# Patient Record
Sex: FEMALE | Race: WHITE | NOT HISPANIC OR LATINO | URBAN - METROPOLITAN AREA
[De-identification: names, ages, dates, MRNs, and addresses within clinical notes are randomized per-mention and may not be internally consistent; named-entity substitution may affect disease eponyms.]

---

## 2020-02-26 ENCOUNTER — APPOINTMENT (RX ONLY)
Dept: URBAN - METROPOLITAN AREA CLINIC 28 | Facility: CLINIC | Age: 45
Setting detail: DERMATOLOGY
End: 2020-02-26

## 2020-02-26 DIAGNOSIS — D485 NEOPLASM OF UNCERTAIN BEHAVIOR OF SKIN: ICD-10-CM

## 2020-02-26 DIAGNOSIS — L73.8 OTHER SPECIFIED FOLLICULAR DISORDERS: ICD-10-CM

## 2020-02-26 DIAGNOSIS — Z85.828 PERSONAL HISTORY OF OTHER MALIGNANT NEOPLASM OF SKIN: ICD-10-CM

## 2020-02-26 DIAGNOSIS — Z71.89 OTHER SPECIFIED COUNSELING: ICD-10-CM

## 2020-02-26 DIAGNOSIS — L82.1 OTHER SEBORRHEIC KERATOSIS: ICD-10-CM

## 2020-02-26 DIAGNOSIS — D22 MELANOCYTIC NEVI: ICD-10-CM

## 2020-02-26 DIAGNOSIS — L81.4 OTHER MELANIN HYPERPIGMENTATION: ICD-10-CM

## 2020-02-26 PROBLEM — D22.5 MELANOCYTIC NEVI OF TRUNK: Status: ACTIVE | Noted: 2020-02-26

## 2020-02-26 PROBLEM — D22.72 MELANOCYTIC NEVI OF LEFT LOWER LIMB, INCLUDING HIP: Status: ACTIVE | Noted: 2020-02-26

## 2020-02-26 PROBLEM — D22.71 MELANOCYTIC NEVI OF RIGHT LOWER LIMB, INCLUDING HIP: Status: ACTIVE | Noted: 2020-02-26

## 2020-02-26 PROBLEM — D22.62 MELANOCYTIC NEVI OF LEFT UPPER LIMB, INCLUDING SHOULDER: Status: ACTIVE | Noted: 2020-02-26

## 2020-02-26 PROBLEM — D48.5 NEOPLASM OF UNCERTAIN BEHAVIOR OF SKIN: Status: ACTIVE | Noted: 2020-02-26

## 2020-02-26 PROBLEM — D22.61 MELANOCYTIC NEVI OF RIGHT UPPER LIMB, INCLUDING SHOULDER: Status: ACTIVE | Noted: 2020-02-26

## 2020-02-26 PROCEDURE — ? COUNSELING

## 2020-02-26 PROCEDURE — ? FULL BODY SKIN EXAM

## 2020-02-26 PROCEDURE — ? DEFER

## 2020-02-26 PROCEDURE — 99203 OFFICE O/P NEW LOW 30 MIN: CPT

## 2020-02-26 ASSESSMENT — LOCATION DETAILED DESCRIPTION DERM
LOCATION DETAILED: RIGHT MEDIAL EYEBROW
LOCATION DETAILED: LEFT PROXIMAL POSTERIOR UPPER ARM
LOCATION DETAILED: LEFT MEDIAL UPPER BACK
LOCATION DETAILED: RIGHT PROXIMAL POSTERIOR UPPER ARM
LOCATION DETAILED: LEFT PROXIMAL PRETIBIAL REGION
LOCATION DETAILED: SUPERIOR THORACIC SPINE
LOCATION DETAILED: RIGHT LATERAL DORSAL FOOT
LOCATION DETAILED: RIGHT PROXIMAL PRETIBIAL REGION
LOCATION DETAILED: RIGHT SUPERIOR MEDIAL UPPER BACK

## 2020-02-26 ASSESSMENT — LOCATION ZONE DERM
LOCATION ZONE: FEET
LOCATION ZONE: LEG
LOCATION ZONE: ARM
LOCATION ZONE: TRUNK
LOCATION ZONE: FACE

## 2020-02-26 ASSESSMENT — LOCATION SIMPLE DESCRIPTION DERM
LOCATION SIMPLE: RIGHT POSTERIOR UPPER ARM
LOCATION SIMPLE: RIGHT EYEBROW
LOCATION SIMPLE: RIGHT UPPER BACK
LOCATION SIMPLE: LEFT PRETIBIAL REGION
LOCATION SIMPLE: RIGHT PRETIBIAL REGION
LOCATION SIMPLE: RIGHT FOOT
LOCATION SIMPLE: LEFT POSTERIOR UPPER ARM
LOCATION SIMPLE: UPPER BACK
LOCATION SIMPLE: LEFT UPPER BACK

## 2020-05-06 ENCOUNTER — TELEPHONE (OUTPATIENT)
Dept: SCHEDULING | Facility: CLINIC | Age: 45
End: 2020-05-06

## 2020-05-06 NOTE — TELEPHONE ENCOUNTER
New Patient Appointment Request    Name of caller: Samreen Kumar    Relationship to patient: self    Diagnosis: NA/ Cardiac Eval .. 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 Wilber Tanner    Previous Cardiologist: PALOMO    Best contact number: [phone]    Additional notes:Please call pt for appt.

## 2020-05-13 NOTE — TELEPHONE ENCOUNTER
Harpreet   I am in the office today at [facility] doing telemedicine visits and we are planning to add some people on for my next Thursday so I cannot see her in May.  My June Thursday schedule is also limited at [facility].  You can offer her an appointment in July with me at [facility].  I do not want to schedule her at [facility] because we are still not sure how the office is going to function-it has not opened yet.  If she needs to see someone sooner she is welcome to see another doctor too.

## 2020-06-10 ENCOUNTER — OFFICE VISIT (OUTPATIENT)
Dept: CARDIOLOGY | Facility: CLINIC | Age: 45
End: 2020-06-10
Payer: COMMERCIAL

## 2020-06-10 VITALS
BODY MASS INDEX: 23.39 KG/M2 | RESPIRATION RATE: 14 BRPM | HEART RATE: 100 BPM | SYSTOLIC BLOOD PRESSURE: 130 MMHG | OXYGEN SATURATION: 99 % | DIASTOLIC BLOOD PRESSURE: 90 MMHG | WEIGHT: 149 LBS | HEIGHT: 67 IN | TEMPERATURE: 98.6 F

## 2020-06-10 DIAGNOSIS — Q21.0 VSD (VENTRICULAR SEPTAL DEFECT): ICD-10-CM

## 2020-06-10 DIAGNOSIS — R07.9 CHEST PAIN, UNSPECIFIED TYPE: Primary | ICD-10-CM

## 2020-06-10 DIAGNOSIS — R00.2 PALPITATIONS: ICD-10-CM

## 2020-06-10 DIAGNOSIS — Z91.89 CARDIOVASCULAR RISK FACTOR: ICD-10-CM

## 2020-06-10 PROBLEM — S06.9XAA BRAIN INJURY (CMS/HCC): Status: ACTIVE | Noted: 2020-06-10

## 2020-06-10 PROBLEM — F19.11 HISTORY OF DRUG ABUSE (CMS/HCC): Status: ACTIVE | Noted: 2020-06-10

## 2020-06-10 PROBLEM — Z87.891 EX-CIGARETTE SMOKER: Status: ACTIVE | Noted: 2020-06-10

## 2020-06-10 PROBLEM — G40.909 SEIZURE DISORDER (CMS/HCC): Status: ACTIVE | Noted: 2020-06-10

## 2020-06-10 PROBLEM — F10.11 HISTORY OF ALCOHOL ABUSE: Status: ACTIVE | Noted: 2020-06-10

## 2020-06-10 PROCEDURE — 93000 ELECTROCARDIOGRAM COMPLETE: CPT | Performed by: INTERNAL MEDICINE

## 2020-06-10 PROCEDURE — 99204 OFFICE O/P NEW MOD 45 MIN: CPT | Performed by: INTERNAL MEDICINE

## 2020-06-10 RX ORDER — PSYLLIUM HUSK 0.4 G
CAPSULE ORAL 3 TIMES DAILY
COMMUNITY

## 2020-06-10 RX ORDER — PSEUDOEPHEDRINE HCL 30 MG
TABLET ORAL
COMMUNITY

## 2020-06-10 RX ORDER — AMANTADINE HYDROCHLORIDE 100 MG/1
100 CAPSULE, GELATIN COATED ORAL 2 TIMES DAILY
COMMUNITY

## 2020-06-10 RX ORDER — LEVETIRACETAM 1000 MG/1
1000 TABLET ORAL 2 TIMES DAILY
COMMUNITY

## 2020-06-10 RX ORDER — FLAXSEED OIL 1000 MG
CAPSULE ORAL DAILY
COMMUNITY

## 2020-06-10 RX ORDER — BUSPIRONE HYDROCHLORIDE 15 MG/1
15 TABLET ORAL 2 TIMES DAILY
COMMUNITY

## 2020-06-10 RX ORDER — SERTRALINE HYDROCHLORIDE 25 MG/1
25 TABLET, FILM COATED ORAL DAILY
COMMUNITY

## 2020-06-10 RX ORDER — BIOTIN 10 MG
TABLET ORAL DAILY
COMMUNITY

## 2020-06-10 ASSESSMENT — ENCOUNTER SYMPTOMS
CONSTITUTIONAL NEGATIVE: 1
MEMORY LOSS: 1
BRUISES/BLEEDS EASILY: 1
ALLERGIC/IMMUNOLOGIC NEGATIVE: 1
MUSCULOSKELETAL NEGATIVE: 1
GASTROINTESTINAL NEGATIVE: 1
SEIZURES: 1
DOUBLE VISION: 1
SPUTUM PRODUCTION: 1
PALPITATIONS: 1

## 2020-06-10 NOTE — LETTER
Flori 10, 2020     Wilber Tanner DO  [address] [address]    Patient: Samreen Kumar  YOB: 1975  Date of Visit: 6/10/2020      Dear Dr. Tanner:    Thank you for referring Samreen Kumar to me for evaluation. Below are my notes for this consultation.    If you have questions, please do not hesitate to call me. I look forward to following your patient along with you.         Sincerely,        Abel Rogers DO        CC: No Recipients  Abel Rogers DO  6/10/2020  2:32 PM  Signed      Chief Complaint: I saw Ms. Kumar as a new patient today.  She is here because she is getting some awareness of her heart rate while she is trying to meditate.  She states it does not make her short of breath lightheaded dizzy nauseous or cause her to vomit.  The palpitations do not come or go are preceded by chest pain or shortness of breath.  She does not experience any syncope.  She also has some erratic breathing she says.  She denies chest discomfort or shortness of breath with exertion, anxiety, cold weather, postprandially, with sex, at rest, or nocturnally.  She can climb a flight of stairs without getting short of breath, she denies proximal nocturnal dyspnea, orthopnea, palpitations she has no syncope no ankle edema she has nocturia.  She states that she had a history of a VSD as a child.  And she had a history of endocarditis.     Medications:  Current Outpatient Medications   Medication Sig Dispense Refill   • amantadine (SYMMETREL) 100 mg capsule Take 100 mg by mouth 2 (two) times a day.     • biotin 10 mg tablet Take by mouth daily.     • busPIRone (BUSPAR) 15 mg tablet Take 15 mg by mouth 2 (two) times a day.     • calcium citrate 250 mg calcium tablet Take by mouth. 300 mg twice daily     • cholecalciferol, vitamin D3, (VITAMIN D3 ORAL) Take 4,000 Units by mouth daily. 150 mg daily     • CHROMIUM ORAL Take by mouth 2 (two) times a day. 200 mg twice daily     • cinnamon bark (CINNAMON  ORAL) Take by mouth. 750 mg three times daily     • flaxseed oil 1,000 mg capsule Take by mouth daily.     • green tea leaf extract (GREEN TEA) capsule Take by mouth 3 (three) times a day.     • L-norgest/e.estradiol-e.estrad (SEASONIQUE ORAL) Take by mouth daily.     • Lactobac no.41/Bifidobact no.7 (PROBIOTIC-10 ORAL) Take by mouth daily.     • levETIRAcetam (KEPPRA) 1,000 mg tablet Take 1,000 mg by mouth 2 (two) times a day.     • selenium 200 mcg capsule Take by mouth 3 (three) times a day.     • sertraline (ZOLOFT) 25 mg tablet Take 25 mg by mouth daily.     • TURMERIC ORAL Take by mouth.     • zinc sulfate (ZINC-15 ORAL) Take by mouth daily.       No current facility-administered medications for this visit.    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  Past surgical history: 5 different brain surgeries, a parathyroidectomy, and multiple GYN procedures.  Social history she smoked 1/2 pack of cigarettes a day for 20 years stopped 11 years ago, alcohol she used to drink 12 beers a day for 24 years stopped 12 years ago, drugs she did cocaine for 15 years stopped 9 years ago.    Family history mother is alive with early dementia no hypertension or diabetes, father is alive with heart disease hypertension and diabetes.  Dietary history: She does not eat beef daily, fried foods daily, does eat greater than 3 eggs per week, eats cheese daily does not drink whole milk.  She drinks no coffee, no tea, no Coke or Pepsi.  Allergies: Trileptal, Bactrim, and penicillin.  Injuries: Fractured fingers.      Review of Systems:  Review of Systems   Constitution: Negative.   HENT: Negative.    Eyes: Positive for double vision.   Cardiovascular: Positive for palpitations.   Respiratory: Positive for sputum production.         Sputum is white to yellow   Endocrine: Positive for heat intolerance.   Hematologic/Lymphatic: Bruises/bleeds easily.   Skin:  Positive for skin cancer.   Musculoskeletal: Negative.    Gastrointestinal: Negative.    Genitourinary: Positive for nocturia.   Neurological: Positive for seizures.   Psychiatric/Behavioral: Positive for memory loss.   Allergic/Immunologic: Negative.        Physical Exam:  Vitals:    06/10/20 1331   BP: 130/90   Pulse: 100   Resp: 14   Temp: 37 °C (98.6 °F)   SpO2: 99%     Physical Exam   Constitutional: She is oriented to person, place, and time. She appears well-developed and well-nourished.   HENT:   Head: Normocephalic and atraumatic.   Eyes: Pupils are equal, round, and reactive to light. Conjunctivae and EOM are normal.   Neck: Normal range of motion. Neck supple.   Cardiovascular: Normal rate, regular rhythm, normal heart sounds and intact distal pulses.   Pulmonary/Chest: Effort normal and breath sounds normal.   Abdominal: Soft. Bowel sounds are normal.   Musculoskeletal: Normal range of motion.   Neurological: She is alert and oriented to person, place, and time. She has normal strength and normal reflexes.   Skin: Skin is warm, dry and intact.   Psychiatric: She has a normal mood and affect. Her speech is normal and behavior is normal. Judgment and thought content normal. Cognition and memory are normal.     EKG:SR WNL    Assessment and Plan:  Impression:  Palpitations.  Dyspnea.  History of endocarditis.  History of ventricular septal defect.  Recommendation:  We are going to do an echocardiogram to look for any scarring or any significant mitral regurgitation or probable tricuspid regurgitation since I do not hear a murmur.  Also be looking for the VSD.  We will do a Holter monitor to try and see what she is talking about in terms of awareness of her heartbeat.  If the studies are negative there is no necessity for her to return.  We will call her tell her if it is necessary and give her the results.  Thank you for allowing us to meet this very nice woman in the office today.    Abel Rogers, DO

## 2020-06-10 NOTE — PROGRESS NOTES
Chief Complaint: I saw Ms. Kumar as a new patient today.  She is here because she is getting some awareness of her heart rate while she is trying to meditate.  She states it does not make her short of breath lightheaded dizzy nauseous or cause her to vomit.  The palpitations do not come or go are preceded by chest pain or shortness of breath.  She does not experience any syncope.  She also has some erratic breathing she says.  She denies chest discomfort or shortness of breath with exertion, anxiety, cold weather, postprandially, with sex, at rest, or nocturnally.  She can climb a flight of stairs without getting short of breath, she denies proximal nocturnal dyspnea, orthopnea, palpitations she has no syncope no ankle edema she has nocturia.  She states that she had a history of a VSD as a child.  And she had a history of endocarditis.     Medications:  Current Outpatient Medications   Medication Sig Dispense Refill   • amantadine (SYMMETREL) 100 mg capsule Take 100 mg by mouth 2 (two) times a day.     • biotin 10 mg tablet Take by mouth daily.     • busPIRone (BUSPAR) 15 mg tablet Take 15 mg by mouth 2 (two) times a day.     • calcium citrate 250 mg calcium tablet Take by mouth. 300 mg twice daily     • cholecalciferol, vitamin D3, (VITAMIN D3 ORAL) Take 4,000 Units by mouth daily. 150 mg daily     • CHROMIUM ORAL Take by mouth 2 (two) times a day. 200 mg twice daily     • cinnamon bark (CINNAMON ORAL) Take by mouth. 750 mg three times daily     • flaxseed oil 1,000 mg capsule Take by mouth daily.     • green tea leaf extract (GREEN TEA) capsule Take by mouth 3 (three) times a day.     • L-norgest/e.estradiol-e.estrad (SEASONIQUE ORAL) Take by mouth daily.     • Lactobac no.41/Bifidobact no.7 (PROBIOTIC-10 ORAL) Take by mouth daily.     • levETIRAcetam (KEPPRA) 1,000 mg tablet Take 1,000 mg by mouth 2 (two) times a day.     • selenium 200 mcg capsule Take by mouth 3 (three) times a day.     • sertraline  (ZOLOFT) 25 mg tablet Take 25 mg by mouth daily.     • TURMERIC ORAL Take by mouth.     • zinc sulfate (ZINC-15 ORAL) Take by mouth daily.       No current facility-administered medications for this visit.    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  Past surgical history: 5 different brain surgeries, a parathyroidectomy, and multiple GYN procedures.  Social history she smoked 1/2 pack of cigarettes a day for 20 years stopped 11 years ago, alcohol she used to drink 12 beers a day for 24 years stopped 12 years ago, drugs she did cocaine for 15 years stopped 9 years ago.    Family history mother is alive with early dementia no hypertension or diabetes, father is alive with heart disease hypertension and diabetes.  Dietary history: She does not eat beef daily, fried foods daily, does eat greater than 3 eggs per week, eats cheese daily does not drink whole milk.  She drinks no coffee, no tea, no Coke or Pepsi.  Allergies: Trileptal, Bactrim, and penicillin.  Injuries: Fractured fingers.      Review of Systems:  Review of Systems   Constitution: Negative.   HENT: Negative.    Eyes: Positive for double vision.   Cardiovascular: Positive for palpitations.   Respiratory: Positive for sputum production.         Sputum is white to yellow   Endocrine: Positive for heat intolerance.   Hematologic/Lymphatic: Bruises/bleeds easily.   Skin: Positive for skin cancer.   Musculoskeletal: Negative.    Gastrointestinal: Negative.    Genitourinary: Positive for nocturia.   Neurological: Positive for seizures.   Psychiatric/Behavioral: Positive for memory loss.   Allergic/Immunologic: Negative.        Physical Exam:  Vitals:    06/10/20 1331   BP: 130/90   Pulse: 100   Resp: 14   Temp: 37 °C (98.6 °F)   SpO2: 99%     Physical Exam   Constitutional: She is oriented to person, place, and time. She appears well-developed and well-nourished.   HENT:   Head:  Normocephalic and atraumatic.   Eyes: Pupils are equal, round, and reactive to light. Conjunctivae and EOM are normal.   Neck: Normal range of motion. Neck supple.   Cardiovascular: Normal rate, regular rhythm, normal heart sounds and intact distal pulses.   Pulmonary/Chest: Effort normal and breath sounds normal.   Abdominal: Soft. Bowel sounds are normal.   Musculoskeletal: Normal range of motion.   Neurological: She is alert and oriented to person, place, and time. She has normal strength and normal reflexes.   Skin: Skin is warm, dry and intact.   Psychiatric: She has a normal mood and affect. Her speech is normal and behavior is normal. Judgment and thought content normal. Cognition and memory are normal.     EKG:SR WNL    Assessment and Plan:  Impression:  Palpitations.  Dyspnea.  History of endocarditis.  History of ventricular septal defect.  Recommendation:  We are going to do an echocardiogram to look for any scarring or any significant mitral regurgitation or probable tricuspid regurgitation since I do not hear a murmur.  Also be looking for the VSD.  We will do a Holter monitor to try and see what she is talking about in terms of awareness of her heartbeat.  If the studies are negative there is no necessity for her to return.  We will call her tell her if it is necessary and give her the results.  Thank you for allowing us to meet this very nice woman in the office today.    Abel Rogers,

## 2020-09-09 ENCOUNTER — TELEPHONE (OUTPATIENT)
Dept: CARDIOLOGY | Facility: CLINIC | Age: 45
End: 2020-09-09

## 2020-09-09 NOTE — TELEPHONE ENCOUNTER
Received notice from registration that authorization is needed for pt's Echo scheduled on 9/17.     No encounter was sent to request pre-cert.     Please obtain pre-cert for this pt.  Thank You!

## 2020-09-17 ENCOUNTER — HOSPITAL ENCOUNTER (OUTPATIENT)
Dept: CARDIOLOGY | Facility: HOSPITAL | Age: 45
Discharge: HOME | End: 2020-09-17
Attending: INTERNAL MEDICINE
Payer: COMMERCIAL

## 2020-09-17 VITALS
WEIGHT: 149 LBS | HEART RATE: 82 BPM | DIASTOLIC BLOOD PRESSURE: 74 MMHG | SYSTOLIC BLOOD PRESSURE: 110 MMHG | HEIGHT: 67 IN | BODY MASS INDEX: 23.39 KG/M2

## 2020-09-17 LAB
AORTIC ROOT ANNULUS - M-MODE: 3.6 CM
AORTIC VALVE MEAN VELOCITY: 0.75 M/S
AORTIC VALVE VELOCITY TIME INTEGRAL: 14.1 CM
AV MEAN GRADIENT: 2 MMHG
AV PEAK GRADIENT: 4 MMHG
AV PEAK VELOCITY-S: 0.98 M/S
BSA FOR ECHO PROCEDURE: 1.79 M2
CUSP SEPARATION: 1.8 CM
DOP CALC LVOT STROKE VOLUME: 46.66 ML
E WAVE DECELERATION TIME: 95 MS
E/A RATIO: 0.9
E/E' RATIO: 12.1
E/LAT E' RATIO: 9.7
EDV (BP): 73 CM3
EDV (MM-TEICH): 92.4 CM3
EF (A4C): 51 %
EF (MM-TEICH): 55.6 %
EF A2C: 51 %
EJECTION FRACTION: 51 %
EST RIGHT VENT SYSTOLIC PRESSURE BY TRICUSPID REGURGITATION JET: 11 MMHG
ESV (BP): 36 CM3
ESV (MM-TEICH): 41 CM3
FRACTIONAL SHORTENING: 29.3 %
INTERVENTRICULAR SEPTUM: 1 CM
LA ESV (BP): 28 CM3
LA ESV INDEX (A2C): 12.29 CM3/M2
LA ESV INDEX (BP): 15.64 CM3/M2
LA/AORTA RATIO: 0.56
LAAS-AP2: 11 CM2
LAAS-AP4: 13 CM2
LAD 2D - M-MODE: 2 CM
LALD A4C: 4.09 CM
LALD A4C: 4.54 CM
LAV-S: 22 CM3
LEFT ATRIUM VOLUME INDEX: 18.44 CM3/M2
LEFT ATRIUM VOLUME: 33 CM3
LEFT INTERNAL DIMENSION IN SYSTOLE: 2.9 CM (ref 2.48–3.75)
LEFT VENTRICLE DIASTOLIC VOLUME INDEX: 41.34 CM3/M2
LEFT VENTRICLE DIASTOLIC VOLUME: 74 CM3
LEFT VENTRICLE SYSTOLIC VOLUME INDEX: 20.11 CM3/M2
LEFT VENTRICLE SYSTOLIC VOLUME: 36 CM3
LEFT VENTRICULAR INTERNAL DIMENSION IN DIASTOLE: 4.1 CM (ref 4.19–5.81)
LEFT VENTRICULAR POSTERIOR WALL IN END DIASTOLE: 1 CM (ref 0.55–1.02)
LV DIASTOLIC VOLUME: 69 CM3
LV ESV (APICAL 2 CHAMBER): 34 CM3
LVAD-AP2: 23.5 CM2
LVAD-AP4: 24 CM2
LVAS-AP2: 15.9 CM2
LVAS-AP4: 15.7 CM2
LVEDVI(A2C): 38.55 CM3/M2
LVEDVI(BP): 40.78 CM3/M2
LVESVI(A2C): 18.99 CM3/M2
LVESVI(BP): 20.11 CM3/M2
LVLD-AP2: 6.85 CM
LVLD-AP4: 6.59 CM
LVLS-AP2: 6.32 CM
LVLS-AP4: 5.89 CM
LVOT 2D: 2.5 CM
LVOT A: 4.91 CM2
LVOT MG: 1 MMHG
LVOT MV: 0.42 M/S
LVOT PEAK VELOCITY: 0.68 M/S
LVOT VTI: 9.51 CM
M MODE - INTERVENTRICULAR SEPTUM IN END DIASTOLE: 0.8 CM
M MODE - LEFT INTERNAL DIMENSION IN SYSTOLE: 3.2 CM
M MODE - LEFT VENTRICULAR INTERNAL DIMENSION IN DIASTOLE: 4.5 CM
M MODE - LEFT VENTRICULAR POSTERIOR WALL IN END DIASTOLE: 0.8 CM
M MODE - LEFT VENTRICULAR POSTERIOR WALL IN END DIASTOLE: 0.8 CM
MV E'TISSUE VEL-LAT: 0.06 M/S
MV E'TISSUE VEL-MED: 0.04 M/S
MV PEAK A VEL: 0.58 M/S
MV PEAK E VEL: 0.53 M/S
POSTERIOR WALL: 1 CM
PULMONARY REGURGITATION LATE DIASTOLIC VELOCITY: 1.1 M/S
RVOT VMAX: 0.48 M/S
RVOT VTI: 6.65 CM
TR MAX PG: 11 MMHG
TRICUSPID VALVE PEAK REGURGITATION VELOCITY: 1.68 M/S
Z-SCORE OF LEFT VENTRICULAR DIMENSION IN END DIASTOLE: -1.85
Z-SCORE OF LEFT VENTRICULAR DIMENSION IN END SYSTOLE: -0.4
Z-SCORE OF LEFT VENTRICULAR POSTERIOR WALL IN END DIASTOLE: 1.51

## 2020-09-17 PROCEDURE — 93306 TTE W/DOPPLER COMPLETE: CPT

## 2020-09-17 PROCEDURE — 93306 TTE W/DOPPLER COMPLETE: CPT | Mod: 26 | Performed by: INTERNAL MEDICINE

## 2021-06-30 ENCOUNTER — APPOINTMENT (RX ONLY)
Dept: URBAN - METROPOLITAN AREA CLINIC 28 | Facility: CLINIC | Age: 46
Setting detail: DERMATOLOGY
End: 2021-06-30

## 2021-06-30 DIAGNOSIS — Z71.89 OTHER SPECIFIED COUNSELING: ICD-10-CM

## 2021-06-30 DIAGNOSIS — Z85.828 PERSONAL HISTORY OF OTHER MALIGNANT NEOPLASM OF SKIN: ICD-10-CM

## 2021-06-30 DIAGNOSIS — L82.1 OTHER SEBORRHEIC KERATOSIS: ICD-10-CM

## 2021-06-30 DIAGNOSIS — L81.4 OTHER MELANIN HYPERPIGMENTATION: ICD-10-CM

## 2021-06-30 DIAGNOSIS — L30.8 OTHER SPECIFIED DERMATITIS: ICD-10-CM

## 2021-06-30 DIAGNOSIS — L73.8 OTHER SPECIFIED FOLLICULAR DISORDERS: ICD-10-CM

## 2021-06-30 DIAGNOSIS — D22 MELANOCYTIC NEVI: ICD-10-CM

## 2021-06-30 DIAGNOSIS — R23.1 PALLOR: ICD-10-CM

## 2021-06-30 PROBLEM — D22.5 MELANOCYTIC NEVI OF TRUNK: Status: ACTIVE | Noted: 2021-06-30

## 2021-06-30 PROBLEM — D22.71 MELANOCYTIC NEVI OF RIGHT LOWER LIMB, INCLUDING HIP: Status: ACTIVE | Noted: 2021-06-30

## 2021-06-30 PROBLEM — D22.72 MELANOCYTIC NEVI OF LEFT LOWER LIMB, INCLUDING HIP: Status: ACTIVE | Noted: 2021-06-30

## 2021-06-30 PROBLEM — D22.61 MELANOCYTIC NEVI OF RIGHT UPPER LIMB, INCLUDING SHOULDER: Status: ACTIVE | Noted: 2021-06-30

## 2021-06-30 PROBLEM — D22.62 MELANOCYTIC NEVI OF LEFT UPPER LIMB, INCLUDING SHOULDER: Status: ACTIVE | Noted: 2021-06-30

## 2021-06-30 PROCEDURE — ? PRESCRIPTION

## 2021-06-30 PROCEDURE — 99214 OFFICE O/P EST MOD 30 MIN: CPT

## 2021-06-30 PROCEDURE — ? COUNSELING

## 2021-06-30 PROCEDURE — ? FULL BODY SKIN EXAM

## 2021-06-30 PROCEDURE — ? PRESCRIPTION MEDICATION MANAGEMENT

## 2021-06-30 PROCEDURE — ? ORDER TESTS

## 2021-06-30 RX ORDER — TRIAMCINOLONE ACETONIDE 1 MG/G
CREAM TOPICAL
Qty: 1 | Refills: 2 | Status: ERX | COMMUNITY
Start: 2021-06-30

## 2021-06-30 RX ADMIN — TRIAMCINOLONE ACETONIDE: 1 CREAM TOPICAL at 00:00

## 2021-06-30 ASSESSMENT — LOCATION DETAILED DESCRIPTION DERM
LOCATION DETAILED: RIGHT MEDIAL EYEBROW
LOCATION DETAILED: RIGHT 4TH TOE
LOCATION DETAILED: RIGHT PROXIMAL PRETIBIAL REGION
LOCATION DETAILED: RIGHT DORSAL MIDDLE FINGER METACARPOPHALANGEAL JOINT
LOCATION DETAILED: LEFT ANTERIOR DISTAL THIGH
LOCATION DETAILED: LEFT PROXIMAL POSTERIOR UPPER ARM
LOCATION DETAILED: RIGHT ANTERIOR DISTAL THIGH
LOCATION DETAILED: RIGHT PROXIMAL POSTERIOR UPPER ARM
LOCATION DETAILED: LEFT MEDIAL UPPER BACK
LOCATION DETAILED: SUPERIOR THORACIC SPINE
LOCATION DETAILED: LEFT PROXIMAL PRETIBIAL REGION
LOCATION DETAILED: RIGHT SUPERIOR MEDIAL UPPER BACK

## 2021-06-30 ASSESSMENT — LOCATION SIMPLE DESCRIPTION DERM
LOCATION SIMPLE: RIGHT EYEBROW
LOCATION SIMPLE: RIGHT 4TH TOE
LOCATION SIMPLE: UPPER BACK
LOCATION SIMPLE: LEFT UPPER BACK
LOCATION SIMPLE: LEFT POSTERIOR UPPER ARM
LOCATION SIMPLE: RIGHT POSTERIOR UPPER ARM
LOCATION SIMPLE: RIGHT UPPER BACK
LOCATION SIMPLE: LEFT PRETIBIAL REGION
LOCATION SIMPLE: RIGHT PRETIBIAL REGION
LOCATION SIMPLE: RIGHT HAND
LOCATION SIMPLE: RIGHT THIGH
LOCATION SIMPLE: LEFT THIGH

## 2021-06-30 ASSESSMENT — LOCATION ZONE DERM
LOCATION ZONE: HAND
LOCATION ZONE: FACE
LOCATION ZONE: TRUNK
LOCATION ZONE: LEG
LOCATION ZONE: ARM
LOCATION ZONE: TOE

## 2021-06-30 NOTE — PROCEDURE: PRESCRIPTION MEDICATION MANAGEMENT
Render In Strict Bullet Format?: No
Initiate Treatment: triamcinolone acetonide 0.1 % topical cream: Apply,a thin layer to AA of hands BID
Detail Level: Zone

## 2021-06-30 NOTE — PROCEDURE: ORDER TESTS
Expected Date Of Service: 06/30/2021
Performing Laboratory: [id number]
Billing Type: Third-Party Bill
Bill For Surgical Tray: no

## 2023-01-04 ENCOUNTER — APPOINTMENT (RX ONLY)
Dept: URBAN - METROPOLITAN AREA CLINIC 28 | Facility: CLINIC | Age: 48
Setting detail: DERMATOLOGY
End: 2023-01-04

## 2023-01-04 DIAGNOSIS — L70.0 ACNE VULGARIS: ICD-10-CM | Status: INADEQUATELY CONTROLLED

## 2023-01-04 DIAGNOSIS — D22 MELANOCYTIC NEVI: ICD-10-CM

## 2023-01-04 DIAGNOSIS — R23.1 PALLOR: ICD-10-CM

## 2023-01-04 DIAGNOSIS — L81.4 OTHER MELANIN HYPERPIGMENTATION: ICD-10-CM

## 2023-01-04 DIAGNOSIS — Z85.828 PERSONAL HISTORY OF OTHER MALIGNANT NEOPLASM OF SKIN: ICD-10-CM

## 2023-01-04 PROBLEM — D22.5 MELANOCYTIC NEVI OF TRUNK: Status: ACTIVE | Noted: 2023-01-04

## 2023-01-04 PROBLEM — D22.72 MELANOCYTIC NEVI OF LEFT LOWER LIMB, INCLUDING HIP: Status: ACTIVE | Noted: 2023-01-04

## 2023-01-04 PROCEDURE — ? DIAGNOSIS COMMENT

## 2023-01-04 PROCEDURE — ? PHOTO-DOCUMENTATION

## 2023-01-04 PROCEDURE — 99214 OFFICE O/P EST MOD 30 MIN: CPT

## 2023-01-04 PROCEDURE — ? PRESCRIPTION

## 2023-01-04 PROCEDURE — ? PRESCRIPTION MEDICATION MANAGEMENT

## 2023-01-04 PROCEDURE — ? SUNSCREEN RECOMMENDATIONS

## 2023-01-04 PROCEDURE — ? COUNSELING

## 2023-01-04 PROCEDURE — ? FULL BODY SKIN EXAM

## 2023-01-04 RX ORDER — BENZOYL PEROXIDE 100 MG/G
LOTION TOPICAL QDAY
Qty: 237 | Refills: 3 | Status: ERX | COMMUNITY
Start: 2023-01-04

## 2023-01-04 RX ORDER — CLINDAMYCIN PHOSPHATE 10 MG/ML
LOTION TOPICAL QAM
Qty: 60 | Refills: 3 | Status: ERX | COMMUNITY
Start: 2023-01-04

## 2023-01-04 RX ADMIN — BENZOYL PEROXIDE: 100 LOTION TOPICAL at 00:00

## 2023-01-04 RX ADMIN — CLINDAMYCIN PHOSPHATE: 10 LOTION TOPICAL at 00:00

## 2023-01-04 ASSESSMENT — LOCATION DETAILED DESCRIPTION DERM
LOCATION DETAILED: SUPERIOR THORACIC SPINE
LOCATION DETAILED: LEFT CENTRAL MALAR CHEEK
LOCATION DETAILED: LEFT MEDIAL 5TH TOE
LOCATION DETAILED: LEFT KNEE

## 2023-01-04 ASSESSMENT — LOCATION ZONE DERM
LOCATION ZONE: TOE
LOCATION ZONE: TRUNK
LOCATION ZONE: LEG
LOCATION ZONE: FACE

## 2023-01-04 ASSESSMENT — LOCATION SIMPLE DESCRIPTION DERM
LOCATION SIMPLE: UPPER BACK
LOCATION SIMPLE: LEFT CHEEK
LOCATION SIMPLE: LEFT 5TH TOE
LOCATION SIMPLE: LEFT KNEE

## 2023-01-04 NOTE — PROCEDURE: COUNSELING
Detail Level: Detailed
Detail Level: Generalized
Isotretinoin Counseling: Patient should get monthly blood tests, not donate blood, not drive at night if vision affected, not share medication, and not undergo elective surgery for 6 months after tx completed. Side effects reviewed, pt to contact office should one occur.
Bactrim Pregnancy And Lactation Text: This medication is Pregnancy Category D and is known to cause fetal risk.  It is also excreted in breast milk.
Spironolactone Pregnancy And Lactation Text: This medication can cause feminization of the male fetus and should be avoided during pregnancy. The active metabolite is also found in breast milk.
Benzoyl Peroxide Counseling: Patient counseled that medicine may cause skin irritation and bleach clothing.  In the event of skin irritation, the patient was advised to reduce the amount of the drug applied or use it less frequently.   The patient verbalized understanding of the proper use and possible adverse effects of benzoyl peroxide.  All of the patient's questions and concerns were addressed.
Azithromycin Pregnancy And Lactation Text: This medication is considered safe during pregnancy and is also secreted in breast milk.
Topical Retinoid counseling:  Patient advised to apply a pea-sized amount only at bedtime and wait 30 minutes after washing their face before applying.  If too drying, patient may add a non-comedogenic moisturizer. The patient verbalized understanding of the proper use and possible adverse effects of retinoids.  All of the patient's questions and concerns were addressed.
Topical Clindamycin Pregnancy And Lactation Text: This medication is Pregnancy Category B and is considered safe during pregnancy. It is unknown if it is excreted in breast milk.
Minocycline Pregnancy And Lactation Text: This medication is Pregnancy Category D and not consider safe during pregnancy. It is also excreted in breast milk.
Topical Sulfur Applications Pregnancy And Lactation Text: This medication is Pregnancy Category C and has an unknown safety profile during pregnancy. It is unknown if this topical medication is excreted in breast milk.
Erythromycin Counseling:  I discussed with the patient the risks of erythromycin including but not limited to GI upset, allergic reaction, drug rash, diarrhea, increase in liver enzymes, and yeast infections.
Doxycycline Counseling:  Patient counseled regarding possible photosensitivity and increased risk for sunburn.  Patient instructed to avoid sunlight, if possible.  When exposed to sunlight, patients should wear protective clothing, sunglasses, and sunscreen.  The patient was instructed to call the office immediately if the following severe adverse effects occur:  hearing changes, easy bruising/bleeding, severe headache, or vision changes.  The patient verbalized understanding of the proper use and possible adverse effects of doxycycline.  All of the patient's questions and concerns were addressed.
Aklief counseling:  Patient advised to apply a pea-sized amount only at bedtime and wait 30 minutes after washing their face before applying.  If too drying, patient may add a non-comedogenic moisturizer.  The most commonly reported side effects including irritation, redness, scaling, dryness, stinging, burning, itching, and increased risk of sunburn.  The patient verbalized understanding of the proper use and possible adverse effects of retinoids.  All of the patient's questions and concerns were addressed.
Azelaic Acid Counseling: Patient counseled that medicine may cause skin irritation and to avoid applying near the eyes.  In the event of skin irritation, the patient was advised to reduce the amount of the drug applied or use it less frequently.   The patient verbalized understanding of the proper use and possible adverse effects of azelaic acid.  All of the patient's questions and concerns were addressed.
Topical Retinoid Pregnancy And Lactation Text: This medication is Pregnancy Category C. It is unknown if this medication is excreted in breast milk.
Isotretinoin Pregnancy And Lactation Text: This medication is Pregnancy Category X and is considered extremely dangerous during pregnancy. It is unknown if it is excreted in breast milk.
Tazorac Pregnancy And Lactation Text: This medication is not safe during pregnancy. It is unknown if this medication is excreted in breast milk.
Dapsone Counseling: I discussed with the patient the risks of dapsone including but not limited to hemolytic anemia, agranulocytosis, rashes, methemoglobinemia, kidney failure, peripheral neuropathy, headaches, GI upset, and liver toxicity.  Patients who start dapsone require monitoring including baseline LFTs and weekly CBCs for the first month, then every month thereafter.  The patient verbalized understanding of the proper use and possible adverse effects of dapsone.  All of the patient's questions and concerns were addressed.
High Dose Vitamin A Pregnancy And Lactation Text: High dose vitamin A therapy is contraindicated during pregnancy and breast feeding.
Birth Control Pills Counseling: Birth Control Pill Counseling: I discussed with the patient the potential side effects of OCPs including but not limited to increased risk of stroke, heart attack, thrombophlebitis, deep venous thrombosis, hepatic adenomas, breast changes, GI upset, headaches, and depression.  The patient verbalized understanding of the proper use and possible adverse effects of OCPs. All of the patient's questions and concerns were addressed.
Spironolactone Counseling: Patient advised regarding risks of diarrhea, abdominal pain, hyperkalemia, birth defects (for female patients), liver toxicity and renal toxicity. The patient may need blood work to monitor liver and kidney function and potassium levels while on therapy. The patient verbalized understanding of the proper use and possible adverse effects of spironolactone.  All of the patient's questions and concerns were addressed.
Include Pregnancy/Lactation Warning?: No
Benzoyl Peroxide Pregnancy And Lactation Text: This medication is Pregnancy Category C. It is unknown if benzoyl peroxide is excreted in breast milk.
Tetracycline Counseling: Patient counseled regarding possible photosensitivity and increased risk for sunburn.  Patient instructed to avoid sunlight, if possible.  When exposed to sunlight, patients should wear protective clothing, sunglasses, and sunscreen.  The patient was instructed to call the office immediately if the following severe adverse effects occur:  hearing changes, easy bruising/bleeding, severe headache, or vision changes.  The patient verbalized understanding of the proper use and possible adverse effects of tetracycline.  All of the patient's questions and concerns were addressed. Patient understands to avoid pregnancy while on therapy due to potential birth defects.
Erythromycin Pregnancy And Lactation Text: This medication is Pregnancy Category B and is considered safe during pregnancy. It is also excreted in breast milk.
Winlevi Counseling:  I discussed with the patient the risks of topical clascoterone including but not limited to erythema, scaling, itching, and stinging. Patient voiced their understanding.
Doxycycline Pregnancy And Lactation Text: This medication is Pregnancy Category D and not consider safe during pregnancy. It is also excreted in breast milk but is considered safe for shorter treatment courses.
Bactrim Counseling:  I discussed with the patient the risks of sulfa antibiotics including but not limited to GI upset, allergic reaction, drug rash, diarrhea, dizziness, photosensitivity, and yeast infections.  Rarely, more serious reactions can occur including but not limited to aplastic anemia, agranulocytosis, methemoglobinemia, blood dyscrasias, liver or kidney failure, lung infiltrates or desquamative/blistering drug rashes.
Sarecycline Counseling: Patient advised regarding possible photosensitivity and discoloration of the teeth, skin, lips, tongue and gums.  Patient instructed to avoid sunlight, if possible.  When exposed to sunlight, patients should wear protective clothing, sunglasses, and sunscreen.  The patient was instructed to call the office immediately if the following severe adverse effects occur:  hearing changes, easy bruising/bleeding, severe headache, or vision changes.  The patient verbalized understanding of the proper use and possible adverse effects of sarecycline.  All of the patient's questions and concerns were addressed.
Topical Sulfur Applications Counseling: Topical Sulfur Counseling: Patient counseled that this medication may cause skin irritation or allergic reactions.  In the event of skin irritation, the patient was advised to reduce the amount of the drug applied or use it less frequently.   The patient verbalized understanding of the proper use and possible adverse effects of topical sulfur application.  All of the patient's questions and concerns were addressed.
Azelaic Acid Pregnancy And Lactation Text: This medication is considered safe during pregnancy and breast feeding.
Azithromycin Counseling:  I discussed with the patient the risks of azithromycin including but not limited to GI upset, allergic reaction, drug rash, diarrhea, and yeast infections.
Minocycline Counseling: Patient advised regarding possible photosensitivity and discoloration of the teeth, skin, lips, tongue and gums.  Patient instructed to avoid sunlight, if possible.  When exposed to sunlight, patients should wear protective clothing, sunglasses, and sunscreen.  The patient was instructed to call the office immediately if the following severe adverse effects occur:  hearing changes, easy bruising/bleeding, severe headache, or vision changes.  The patient verbalized understanding of the proper use and possible adverse effects of minocycline.  All of the patient's questions and concerns were addressed.
Aklief Pregnancy And Lactation Text: It is unknown if this medication is safe to use during pregnancy.  It is unknown if this medication is excreted in breast milk.  Breastfeeding women should use the topical cream on the smallest area of the skin for the shortest time needed while breastfeeding.  Do not apply to nipple and areola.
Tazorac Counseling:  Patient advised that medication is irritating and drying.  Patient may need to apply sparingly and wash off after an hour before eventually leaving it on overnight.  The patient verbalized understanding of the proper use and possible adverse effects of tazorac.  All of the patient's questions and concerns were addressed.
Dapsone Pregnancy And Lactation Text: This medication is Pregnancy Category C and is not considered safe during pregnancy or breast feeding.
Topical Clindamycin Counseling: Patient counseled that this medication may cause skin irritation or allergic reactions.  In the event of skin irritation, the patient was advised to reduce the amount of the drug applied or use it less frequently.   The patient verbalized understanding of the proper use and possible adverse effects of clindamycin.  All of the patient's questions and concerns were addressed.
Winlevi Pregnancy And Lactation Text: This medication is considered safe during pregnancy and breastfeeding.
High Dose Vitamin A Counseling: Side effects reviewed, pt to contact office should one occur.
Birth Control Pills Pregnancy And Lactation Text: This medication should be avoided if pregnant and for the first 30 days post-partum.

## 2023-01-04 NOTE — PROCEDURE: SUNSCREEN RECOMMENDATIONS
Products Recommended: Recommended using over-the-counter sunscreen SPF 30 or greater, with UVB and UVA protection.\\n\\nAccording to Consumer Reports:\\n\\nThree sunscreens were given the Consumer Reports \"Best Buy\" rating:\\n\\nUp & Up Sport SPF 30\\nNo-Ad with Aloe and Vitamin E SPF 45\\nEquate Baby SPF 50\\n\\nSix others were recommended:\\nBanana Boat Sport Performance SPF 30\\nCoppertone Sport Ultra Sweatproof SPF 30\\nCVS Fast Cover Sport SPF 30\\nW[employer]reens Sport SPF 50\\nOcean Potion Kids Instant Dry Mist SPF 50\\nBanana Boat Sport Performance 
Detail Level: Generalized
General Sunscreen Counseling: Yearly skin checks with history of skin cancer.\\n\\nThe nature of sun-induced photo-aging and skin cancers is discussed. Sun avoidance, protective clothing, and the use of 30-SPF sunscreens are advised. Observe closely for skin damage/changes, and call if such occur.\\n\\nAdvised to use daily sunscreen SPF 30 with UVB and UVA protection daily. Apply at least 10 minutes prior to going outside, and reapply every 2 hours outside. Recommended sunblocks with zinc oxide or titanium dioxide (though these tend not to rub in as well).\\n

## 2023-01-04 NOTE — PROCEDURE: PRESCRIPTION MEDICATION MANAGEMENT
Detail Level: Zone
Initiate Treatment: clindamycin 1 % lotion: Apply a thin layer to face QAM\\nbenzoyl peroxide 10 % topical cleanser: Wash acne of face QDAY in the shower
Render In Strict Bullet Format?: No

## 2023-01-04 NOTE — PROCEDURE: DIAGNOSIS COMMENT
Comment: Likely a side effect of amantadine, improved with lower dose per patient
Detail Level: Simple
Render Risk Assessment In Note?: no

## 2023-09-26 ENCOUNTER — CONSULT (OUTPATIENT)
Dept: NEUROLOGY | Facility: CLINIC | Age: 48
End: 2023-09-26
Payer: COMMERCIAL

## 2023-09-26 VITALS
WEIGHT: 149.2 LBS | HEIGHT: 66 IN | OXYGEN SATURATION: 96 % | BODY MASS INDEX: 23.98 KG/M2 | HEART RATE: 76 BPM | SYSTOLIC BLOOD PRESSURE: 110 MMHG | DIASTOLIC BLOOD PRESSURE: 76 MMHG

## 2023-09-26 DIAGNOSIS — G43.709 CHRONIC MIGRAINE WITHOUT AURA WITHOUT STATUS MIGRAINOSUS, NOT INTRACTABLE: ICD-10-CM

## 2023-09-26 DIAGNOSIS — R51.9 WORSENING HEADACHES: ICD-10-CM

## 2023-09-26 DIAGNOSIS — G40.219 FOCAL EPILEPSY WITH IMPAIRMENT OF CONSCIOUSNESS, INTRACTABLE (HCC): Primary | ICD-10-CM

## 2023-09-26 PROBLEM — D07.1 CARCINOMA IN SITU OF VULVA: Status: ACTIVE | Noted: 2021-03-17

## 2023-09-26 PROBLEM — Z87.820 HISTORY OF TRAUMATIC BRAIN INJURY: Status: ACTIVE | Noted: 2018-09-21

## 2023-09-26 PROBLEM — I34.1 MITRAL VALVE PROLAPSE: Status: ACTIVE | Noted: 2023-09-26

## 2023-09-26 PROBLEM — Z86.39 HX OF HYPERPARATHYROIDISM: Status: ACTIVE | Noted: 2019-09-23

## 2023-09-26 PROBLEM — S03.00XA TMJ (DISLOCATION OF TEMPOROMANDIBULAR JOINT): Status: ACTIVE | Noted: 2018-09-21

## 2023-09-26 PROBLEM — R87.610 ATYPICAL SQUAMOUS CELLS OF UNDETERMINED SIGNIFICANCE ON CYTOLOGIC SMEAR OF CERVIX (ASC-US): Status: ACTIVE | Noted: 2021-03-17

## 2023-09-26 PROBLEM — G40.909 EPILEPSY (HCC): Status: ACTIVE | Noted: 2017-02-24

## 2023-09-26 PROBLEM — Z87.412 HISTORY OF VULVAR DYSPLASIA: Status: ACTIVE | Noted: 2021-03-17

## 2023-09-26 PROCEDURE — 99205 OFFICE O/P NEW HI 60 MIN: CPT | Performed by: PSYCHIATRY & NEUROLOGY

## 2023-09-26 RX ORDER — LEVETIRACETAM 1000 MG/1
TABLET ORAL
COMMUNITY
Start: 2015-09-01 | End: 2023-09-26 | Stop reason: SDUPTHER

## 2023-09-26 RX ORDER — ALBUTEROL SULFATE 90 UG/1
AEROSOL, METERED RESPIRATORY (INHALATION)
COMMUNITY
Start: 2023-08-30

## 2023-09-26 RX ORDER — ASCORBATE CALCIUM 500 MG
TABLET ORAL
COMMUNITY

## 2023-09-26 RX ORDER — DIMENHYDRINATE 50 MG
TABLET ORAL DAILY
COMMUNITY

## 2023-09-26 RX ORDER — TRAZODONE HYDROCHLORIDE 50 MG/1
50 TABLET ORAL
COMMUNITY

## 2023-09-26 RX ORDER — RIZATRIPTAN BENZOATE 10 MG/1
10 TABLET ORAL AS NEEDED
Qty: 9 TABLET | Refills: 5 | Status: SHIPPED | OUTPATIENT
Start: 2023-09-26

## 2023-09-26 RX ORDER — PRASTERONE (DHEA) 50 MG
1 CAPSULE ORAL DAILY
COMMUNITY

## 2023-09-26 RX ORDER — LEVETIRACETAM 1000 MG/1
1000 TABLET ORAL 2 TIMES DAILY
Qty: 180 TABLET | Refills: 3 | Status: SHIPPED | OUTPATIENT
Start: 2023-09-26

## 2023-09-26 RX ORDER — ACETAMINOPHEN 500 MG
TABLET ORAL
COMMUNITY

## 2023-09-26 RX ORDER — PNV NO.95/FERROUS FUM/FOLIC AC 28MG-0.8MG
TABLET ORAL
COMMUNITY

## 2023-09-26 RX ORDER — VITAMIN K2 90 MCG
CAPSULE ORAL
COMMUNITY
End: 2023-09-26

## 2023-09-26 RX ORDER — NORTRIPTYLINE HYDROCHLORIDE 25 MG/1
CAPSULE ORAL
Qty: 60 CAPSULE | Refills: 5 | Status: SHIPPED | OUTPATIENT
Start: 2023-09-26

## 2023-09-26 RX ORDER — LEVONORGESTREL / ETHINYL ESTRADIOL AND ETHINYL ESTRADIOL 150-30(84)
KIT ORAL
COMMUNITY

## 2023-09-26 NOTE — PROGRESS NOTES
[facility]  Patient's Name: Eryn Gonzalez   Patient's : 1975   Visit Type: new patient  Referring MD / PCP:  Ion Hodgson DO    Assessment:  Ms. Eryn Gonzalez is a 53 y.o. woman with focal epilepsy, s/p right temporal lobectomy, which helped controlled her seizures. She continues with levetiracetam without a breakthrough seizure since her surgery. She had multiple traumatic brain injury in the past, while the one in  resulted in more significant physical impairments, convergence disorder of the eye, and persistent gait impairment. She also has intractable chronic daily headaches that has not been assessed with a brain imaging study since 2016. Due to persistent headaches, we need to rule out a structural cause of her headaches. During this visit, we reviewed seizure safety and seizure first aid. She will continue with levetiracetam.  We then discussed options to manage her migraine headaches. Migraines can be worsened by the chronic use of analgesics (medication overuse headaches) and poor sleep habits. She needs to cut back on the use of acetaminophen and ibuprofen, especially if these do not provide significant relief. We should start a preventative medication such as TCA or even topiramate. TCA, such as nortriptyline can induce some somnolence which may help with her insomnia. We reviewed that medications are not immediately effective and it may take 3-4 months to notice lessening frequency of headaches. Side effects of dry eyes, dry mouth, blurry vision, lightheadedness, were discussed with the patient. She should also try using migraine specific abortive medications which may lessen the chance of medication overuse headaches.       Plan:   Focal Epilepsy  - s/p right temporal lobectomy  - continue with Levetiracetam 1000mg twice a day  - check BMP and levetiracetam prior to MRI brain study  - MRI brain w/wo contrast    Worsening headaches, chronic daily migraines, TBI  - MRI brain w/wo contrast (assessment for IRLANDA and focal structural pathology to cause headaches)  - start nortriptyline 25mg at bedtime for 2 weeks then 50mg at bedtime  - limit the use of acetaminophen and ibuprofen to no more than 2 days a week as these medications can cause worsening migraines  - try rizatriptan (Maxalt) 10mg tab take one tab at the start of a headache, may repeat in 2 hours. Side effects include chest pain, palpitations, and flushing    Follow-up in 3 months with advanced practice provider         Problem List Items Addressed This Visit        Cardiovascular and Mediastinum    Chronic migraine without aura without status migrainosus, not intractable    Relevant Medications    traZODone (DESYREL) 50 mg tablet    nortriptyline (PAMELOR) 25 mg capsule    levETIRAcetam (Keppra) 1000 MG tablet    rizatriptan (Maxalt) 10 mg tablet       Nervous and Auditory    Focal epilepsy with impairment of consciousness, intractable (HCC) - Primary    Relevant Medications    levETIRAcetam (Keppra) 1000 MG tablet    Other Relevant Orders    Levetiracetam level    Basic metabolic panel    MRI brain seizure wo and w contrast       Other    Worsening headaches    Relevant Orders    MRI brain seizure wo and w contrast           Chief Complaint:   Chief Complaint   Patient presents with   • Seizures   • Headache      HPI:      Irving Hendrix is a 53 y.o. ambidextrous handed female here for new patient evaluation of epilepsy. She was previously evaluated by [facility]. [facility]. The following is from interviewing the patient and review of the available office/hospital notes. Intake History 9/26/2023  She was previously seen by Dr. Abiola Escobar at [facility], last seen on 2/22/2023. In 2012, she was hit by a steel madyson into her head at work, developed weakness of the right side, then intractable seizures. Seizures are reported as confusion, up to 16 times a day.   She had epilepsy surgery at [facility] (9/10/2015, temporal lobectomy), then she was seizure free while on Keppra 1000mg twice a day. She was previously seen by Dr. Kwabena Cota at [facility] for intractable headaches (over the front or behind the right eye, she was known to have bilateral INOs and a skew deviation, received prism glasses), ringing in her ears. There is a note from 7/28/2014 at [facility] (Dr. Jimenez Carr) - she had generalized tonic clonic seizures as a teenager (16 yo). Since then she has had multiple concussions due to boxing and TBI in 2010 (hit her head on concrete) and 2012 (hit on the head with a steel beam). She complains of post-concussive symptoms of right sided weakness, visual difficulties, ataxia, photophobia, and nausea. She had a seizure during neuropsychology evaluation on 3/20/2014 - she became unfocused, leaned back in chair, unable to speak, flushed, hot, she described a feeling of "love vu". She was admitted to [facility] 6/9-6/13/2014 and was found to have right temporal seizures. Seizures - she gets hot, sweaty, "love vu", tingling from pelvis down, nauseated, voices sound like [employer] voices, feels anxious, then loses 2-5 minutes (unable to remember). She is then exhausted and sleeps. There is a note by Dr. Brittany Pugh (4/2/2015) which also mentions that seizures started when she was 17 and had tonic-clonic seizures, she was initially treated with valproate for several years, then was off the medication for almost 20 years. Seizures re-emerge in June 2014. She had intracranial EEG monitoring on 8/6/2015 with strips and depth electrodes (hippocampus and amygdala) into the right side. Then she had a right anterior temporal lobectomy with amygdalo-hippocampectomy on 9/15/2015. Dr. Kelly Taylor ([facility]). She has dysconjugate gaze when tracking with her eyes, slight left endgaze nystagmus in the left eye.   May not be called MARIE but there is some incoordination between the two eyes. Patient's history:  She starts by saying that she had a brain injury in 2012. She recalled that her seizures involve love vu, smell like she is on the beach, then she would not remember a couple of hours, these "black outs", her friends would say that she could not talk. She does not remember the episodes/seizures, she is not aware of having had more convulsive seizures since she was a teenager. Since her brain surgery, she is not aware of having had any seizures. Last seizure that she can recall was on 9/10/2015. She has terrible headaches, with dizziness, worse with barometric changes, hot, humid, or the rain. She has right sided pressure, sometimes pounding, sensitive to noises, headaches can last 2-5 hours, she does not get nauseated. She has to go to sleep. Currently, she has 2-3 days of headaches of the week. Headaches have been present at this frequency for the past several years. She would take acetaminophen 1000mg and alternate with ibuprofen 400mg on the days, which only take the edge off the headache. Headaches are triggered when she is overstimulated, such as too much visual stimulation. She has tried caffeine medications, which makes her migraines worse. She was previously offered antimigraine medications but she had refused because of her history of addiction. She has bouts of insomnia, which can happen a couple of nights a week. She has been prescribed trazodone. She has been "clean" from cocaine and alcohol for the past 14 years. She had recently stopped taking amantadine, which was prescribed for her TBI, she felt that it was causing her to stay up. Amantadine was prescribed because she was twitching. She stopped taking amatadine about 6 months ago. She believes she sleeps more, but no impact on the twitching behavior. She was on Buspar which she also stopped 6 months ago.   She is not sure why she was on Buspar, but she thought it was for her ataxia. AED/side effects/compliance:  Levetiracetam [id number]    Migraine management:  Prior Abortive medications:  Acetaminophen  Ibuprofen     Prior preventative medications:  none    Event/Seizure semiology:  1. FIAS - she gets hot, sweaty, "love vu" feeling, tingling from pelvis down, nauseated, voices sound like [employer] voices, feels anxious, then impaired awareness  2.  History of generalized tonic clonic seizures in her late teens    Woman of childbearing age with Epilepsy:  Contraception: birth control  Folic acid supplement:  No    Prior Epilepsy History:  x    Special Features  Status epilepticus: No  Self Injury Seizures: No  Precipitating Factors: None  Post-ictal state: tired    Epilepsy Risk Factors:  Abnormal pregnancy: mother consumed alcohol during pregnancy  Abnormal birth/: she was born 2 months prematurely  Abnormal Development: No  Febrile seizures, simple: No  Febrile seizures, complex: No  CNS infection: No  Intellectual disability: No  Cerebral palsy: No  Head injury (moderate/severe): Multiple concussions, boxing, hit on the head by a steel beam in   CNS neoplasm: No  CNS malformation: No  Neurosurgical procedure: Yes - placement of depth and strip electrodes (2015), s/p right temporal lobectomy  Stroke: No  CNS autoimmune disorder: No  Alcohol abuse: Yes - last use was 2009  Drug abuse: Yes - last use was 2009  Family history Sz/epilepsy: No    Prior AEDs:  medication Max dose Time used Reason to stop   levetiracetam      oxcarbazepine   Swollen lymph node   zonisamide      valproate  As a teenager            Prior workup:  x  Imagin2012 - [facility]  Trauma to head  No acute hemorrhage, no skull fracture    2014 - [facility]  MRI brain   Symmetric hippocampal structures  Unremarkable MRI brain study    2015 - [facility]  fMRI brain   Left hippocampus is slightly smaller than the right, but no abnormal signal or loss of internal architecture    4/17/2015 - [facility] PET brain  Inferomedial right temporal lobe appears less active than the contralateral side    9/23/2016 - [facility]  Previous anterior right temporal lobectomy  Small rim of gliosis around the operative cavity  Left hippocampus is normal morphology      EEGs:  4/6/2015   EEG - [facility]  Right basal temporal sharp wave    6/17/2016 - [facility]  Normal study    Labs:  9/7/2023  /4.4/108/17/12/0.8/91    General exam   /76   Pulse 76   Ht 5' 6" (1.676 m)   Wt 67.7 kg (149 lb 3.2 oz)   SpO2 96%   BMI 24.08 kg/m²    Appearance: normocephalic, no acute distress  Carotids: no bruits present  Cardiovascular: regular rate and rhythm and normal heart sounds  Pulmonary: clear to auscultation  Abdominal: nondistended  Extremities: no edema    HEENT: anicteric and moist mucus membranes / oral cavity   Fundoscopy: bilateral optic discs are sharp    Mental status  Orientation: alert and oriented to name, place, time  Fund of Knowledge: intact   Attention and Concentration: able to spell HOUSE forwards and backwards  Current and Remote Memory:recalled 2/3 words after five minutes  Language: spontaneous speech is normal and comprehension is intact    Cranial Nerves  CN 1: not tested  CN 2: Visual fields intact to confrontation and pupils equal round reactive to direct and consenual light   CN 3, 4, 6: EOMI, no nystagmus, but with convergence she seems to have limitation with left eye convergance  CN 5:sensation intact to all distribution V1, V2, V3  CN 7:muscles of facial expression are symmetric  CN 8:symmetric to finger rubs bilaterally  CN 9, 10:no dysarthria present  CN 11:symmetric strength of sternocleidomastoid and trapezius muscles  CN 12:tongue is midline    Motor:  Bulk, Tone: normal bulk, normal tone  Pronation: no pronator drift  Strength: Patient has full strength symmetrically of shoulder abduction, biceps, triceps, wrist flexion, wrist extension, finger flexion, finger abduction, hip flexion, knee flexion, knee extension, dorsiflexion, plantar flexion.   Abnormal movements: intermittently she has "twitches" nonfocal, sometimes she lists at the trunk    Sensory:  Lighttouch: intact in all limbs  Romberg:normal    Coordination:  FNF:FNF bilaterally intact  VINICIO:intact  FFM:intact  Gait/Station:she is able to tandem walk but overshoots placement of her feet and have to perform a checking process with her arms and normal gait    Reflexes:  Reflexes were normal and symmetric, tested bilateral biceps, triceps, brachiradialis, patellar, ankles    Past Medical/Surgical History:  Patient Active Problem List   Diagnosis   • Atypical squamous cells of undetermined significance on cytologic smear of cervix (ASC-US)   • Carcinoma in situ of vulva   • Convergence insufficiency   • Worsening headaches   • History of traumatic brain injury   • History of vulvar dysplasia   • Hx of hyperparathyroidism   • Mitral valve prolapse   • 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   • Focal epilepsy with impairment of consciousness, intractable (HCC)   • TMJ (dislocation of temporomandibular joint)   • Chronic migraine without aura without status migrainosus, not intractable     Past Medical History:   Diagnosis Date   • Cluster headache    • Head injury    • Migraine    • Movement disorder    • Seizures ([address])      Past Surgical History:   Procedure Laterality Date   • APPENDECTOMY     • BRAIN SURGERY Right     temporal lobectomy   • BRAIN SURGERY Bilateral     intracranial EEG monitoring   • CRANIOTOMY     • GYNECOLOGIC CRYOSURGERY     • PARATHYROID GLAND SURGERY         Past Psychiatric History:  Depression: denies  Anxiety: denies  Psychosis: No  She was admitted to behavioral health when she was a teenager for substance abuse    Medications:    Current Outpatient Medications:   •  albuterol (PROVENTIL HFA,VENTOLIN HFA) 90 mcg/act inhaler, TAKE 2 PUFFS BY MOUTH EVERY 4 TO 6 HOURS AS NEEDED, Disp: , Rfl:   •  APPLE CIDER VINEGAR PO, Take by mouth, Disp: , Rfl:   •  B Complex Vitamins (B COMPLEX VITAMIN PO), Take 1 capsule by mouth in the morning, Disp: , Rfl:   •  BIOTIN PO, Take 1,000 mcg by mouth daily, Disp: , Rfl:   •  BLACK PEPPER-TURMERIC PO, Take by mouth, Disp: , Rfl:   •  Cholecalciferol (VITAMIN D3 PO), Take 4,000 Units by mouth daily, Disp: , Rfl:   •  DHEA 50 MG CAPS, Take 1 tablet by mouth daily, Disp: , Rfl:   •  Flaxseed, Linseed, (Flax Seed Oil) 1000 MG CAPS, Take by mouth daily, Disp: , Rfl:   •  levETIRAcetam (Keppra) 1000 MG tablet, Take 1 tablet (1,000 mg total) by mouth 2 (two) times a day, Disp: 180 tablet, Rfl: 3  •  Levonorgest-Eth Estrad 91-Day 0.15-0.03 &0.01 MG TABS, Take by mouth, Disp: , Rfl:   •  MAGNESIUM PO, Take 1 tablet by mouth daily, Disp: , Rfl:   •  Multiple Vitamin (MULTIVITAMIN PO), Take 1 tablet by mouth daily, Disp: , Rfl:   •  nortriptyline (PAMELOR) 25 mg capsule, Take one cap by mouth at bedtime for 2 weeks, then two caps at bedtime, Disp: 60 capsule, Rfl: 5  •  Omega-3 Fatty Acids (Fish Oil Omega-3) 1000 MG CAPS, Take by mouth, Disp: , Rfl:   •  Probiotic, Lactobacillus, CAPS, Take by mouth, Disp: , Rfl:   •  rizatriptan (Maxalt) 10 mg tablet, Take 1 tablet (10 mg total) by mouth as needed for migraine Take at the onset of migraine; if symptoms continue or return, may take another dose at least 2 hours after first dose. Take no more than 2 doses in a day., Disp: 9 tablet, Rfl: 5  •  selenium 200 mcg, Take 200 mcg by mouth daily, Disp: , Rfl:   •  traZODone (DESYREL) 50 mg tablet, Take 50 mg by mouth daily at bedtime as needed for sleep, Disp: , Rfl:   •  Vitamin E 100 units TABS, Take by mouth, Disp: , Rfl:   •  Zinc Sulfate (ZINC 15 PO), Take by mouth, Disp: , Rfl:     Allergies:   Allergies   Allergen Reactions   • Oxcarbazepine Rash and Swelling     Rapid lymph node enlargement and rash  Rapid lymph node enlargement and rash     • Sulfamethoxazole-Trimethoprim Hives and Rash   • Latex Hives, Itching and Rash   • Penicillins Other (See Comments)     UTI  UTI  unknown  unknown         Family history:  Family History   Problem Relation Age of Onset   • Muscular dystrophy Brother         myotonic dystrophy     There is no family history of seizure, epilepsy or developmental delay. Social History  Living situation:  Lives alone  Work:  Completed college, disability from her TBI because she has convergence insufficiency, ataxia, dizziness  Driving:  No due to visual impairment. reports that she has quit smoking. Her smoking use included cigarettes. She has never used smokeless tobacco. She reports that she does not currently use alcohol. She reports that she does not currently use drugs. Review of Systems  A review of at least 12 organ/systems was obtained by the medical assistant and reviewed by me, including additional positives/negatives:  Neurological: Positive for seizures (last was in 2015 ) and headaches. Decision making was of high-complexity due to the patient's high risk condition (seizures), psychiatric and neuropsychological comorbidities, behavioral problems, memory and cognitive problems and medication side effects. The total amount of time spent with the patient along with pre-chart and post-chart preparation was 72 minutes on the calendar day of the date of service. This included history taking, physical exam, review of ancillary testing, counseling provided to the patient regarding diagnosis, medications, treatment, and risk management, and other communication to the patient's providers and/or family.   Start time: 11:20AM  End time: 12:32PM

## 2023-09-26 NOTE — PATIENT INSTRUCTIONS
Plan:   Focal Epilepsy  - s/p right temporal lobectomy  - continue with Levetiracetam 1000mg twice a day  - check BMP and levetiracetam prior to MRI brain study  - MRI brain w/wo contrast    Worsening headaches, chronic daily migraines, TBI  - MRI brain w/wo contrast (assessment for IRLANDA and focal structural pathology to cause headaches)  - start nortriptyline 25mg at bedtime for 2 weeks then 50mg at bedtime  - limit the use of acetaminophen and ibuprofen to no more than 2 days a week as these medications can cause worsening migraines  - try rizatriptan (Maxalt) 10mg tab take one tab at the start of a headache, may repeat in 2 hours. Side effects include chest pain, palpitations, and flushing    Follow-up in 3 months with advanced practice provider      Migraine Headache   AMBULATORY CARE:   A migraine headache  is a severe headache. The pain can be so severe that it interferes with your daily activities. A migraine can last a few hours up to several days. The exact cause of migraines is not known. A family history of migraines increases your risk. Your risk is also higher if you are a woman or take medicines such as estrogen or a vasodilator. Common warning signs include the following:  Warning signs usually start 15 to 60 minutes before the headache:  Visual changes (auras), such as blurred vision, temporary blind or bright spots, lines, or hallucinations    Unusual tiredness or frequent yawning    Tingling in an arm or leg    Signs and symptoms of a migraine headache:  A migraine headache usually begins as a dull ache around the eye or temple. The pain may get worse with movement.  You may also have the following:  Pain in your head that may increase to the point that you cannot do everyday activities    Pain on one or both sides of your head    Throbbing, pulsing, or pounding pain in your head    Nausea and vomiting    Sensitivity to light, noise, or smells    Call your local emergency number (911 in the [address]) or have someone call if:   You feel like you are going to faint, you become confused, or you have a seizure. Seek care immediately if:   You have a headache that seems different or much worse than your usual migraine headache. You have a severe headache with a fever or a stiff neck. You have new problems with speech, vision, balance, or movement. Call your doctor or neurologist if:   You have a fever. Your migraines interfere with your daily activities. Your medicines or treatments stop working. You have questions about your condition or care. Treatment:  Migraines cannot be cured. The goal of treatment is to reduce your symptoms. Take medicine as soon as you feel a migraine begin, or as directed. The following may be used to manage migraines:  Medicines  may be given to prevent or treat migraines. Take medicine to prevent migraines as soon as you feel a migraine begin, or as directed. Your healthcare provider may recommend any of the following:    Migraine medicines  are used to help prevent or stop a migraine. NSAIDs  help decrease swelling and pain or fever. This medicine is available with or without a doctor's order. NSAIDs can cause stomach bleeding or kidney problems in certain people. If you take blood thinner medicine, always ask your healthcare provider if NSAIDs are safe for you. Always read the medicine label and follow directions. Acetaminophen  decreases pain and fever. It is available without a doctor's order. Ask how much to take and how often to take it. Follow directions. Read the labels of all other medicines you are using to see if they also contain acetaminophen, or ask your doctor or pharmacist. Acetaminophen can cause liver damage if not taken correctly. Prescription pain medicine  may be given. Ask your healthcare provider how to take this medicine safely. Some prescription pain medicines contain acetaminophen.  Do not take other medicines that contain acetaminophen without talking to your healthcare provider. Too much acetaminophen may cause liver damage. Prescription pain medicine may cause constipation. Ask your healthcare provider how to prevent or treat constipation. Antinausea medicine  may be given to calm your stomach and to help prevent vomiting. This medicine can also help relieve pain. Cognitive behavior therapy (CBT)  can help you learn ways to manage and prevent migraines. A therapist can teach you to relax and to reduce stress. You may learn ways to create healthy nutrition, activity, and sleep habits to prevent migraines. The therapist can also help you manage conditions that can affect migraines, such as anxiety or depression. Common triggers for a migraine include the following:   Stress, eye strain, oversleeping, or not getting enough sleep    Hormone changes in women from birth control pills, pregnancy, menopause, or during a monthly period    Skipping meals, going too long without eating, or not drinking enough liquids    Certain foods or drinks such as chocolate, hard cheese, alcohol, or drinks that contain caffeine    Foods that contain gluten, nitrates, MSG, or artificial sweeteners    Sunlight, bright or flashing lights, loud noises, smoke, or strong smells    Heat, humidity, or changes in the weather    Manage your symptoms:   Rest in a dark, quiet room. This will help decrease your pain. Sleep may also help relieve the pain. Apply ice to decrease pain. Use an ice pack, or put crushed ice in a plastic bag. Cover the ice pack with a towel and place it on your head where it hurts for 15 to 20 minutes every hour. Apply heat to decrease pain and muscle spasms. Use a small towel dampened with warm water or a heating pad, or sit in a warm bath. Apply heat on the area for 20 to 30 minutes every 2 hours. You may alternate heat and ice. Keep a migraine record. Write down when your migraines start and stop.  Include your symptoms and what you were doing when a migraine began. Record what you ate or drank for 24 hours before the migraine started. Keep track of what you did to treat your migraine and if it worked. Prevent another migraine headache:   Prevent a medicine overuse headache. Take pain medicines only as long as directed. A medicine may be limited to a certain amount each month. Your healthcare provider can help you create a plan so you get a safe amount each month. Do not smoke. Nicotine and other chemicals in cigarettes and cigars can trigger a migraine and also cause lung damage. Ask your healthcare provider for information if you currently smoke and need help to quit. E-cigarettes or smokeless tobacco still contain nicotine. Talk to your healthcare provider before you use these products. Do not drink alcohol. Alcohol can trigger a migraine. It can also interfere with the medicines used to treat your migraine. Be physically active. Physical activity, such as exercise, may help prevent migraines. Talk to your healthcare provider about the best activity plan for you. Try to get at least 30 minutes of physical activity on most days. Manage stress. Stress may trigger a migraine. Learn new ways to relax, such as deep breathing. Follow a sleep schedule. Go to bed and get up at the same time each day. Eat a variety of healthy foods. Healthy foods include fruit, vegetables, whole-grain breads, low-fat dairy products, beans, lean meats, and fish. Do not have foods or drinks that trigger your migraines. Drink more liquids to prevent dehydration. Your healthcare provider can tell you how much liquid to drink each day and which liquids are best for you. Follow up with your doctor or neurologist as directed:  Bring your migraine record with you. Write down your questions so you remember to ask them during your visits.   © Copyright [employer] 2023 Information is for End User's use only and may not be sold, redistributed or otherwise used for commercial purposes. The above information is an  only. It is not intended as medical advice for individual conditions or treatments. Talk to your doctor, nurse or pharmacist before following any medical regimen to see if it is safe and effective for you.

## 2023-10-04 ENCOUNTER — APPOINTMENT (OUTPATIENT)
Dept: LAB | Facility: CLINIC | Age: 48
End: 2023-10-04
Payer: COMMERCIAL

## 2023-10-04 DIAGNOSIS — G40.219 FOCAL EPILEPSY WITH IMPAIRMENT OF CONSCIOUSNESS, INTRACTABLE (HCC): ICD-10-CM

## 2023-10-04 LAB
ANION GAP SERPL CALCULATED.3IONS-SCNC: 8 MMOL/L
BUN SERPL-MCNC: 11 MG/DL (ref 5–25)
CALCIUM SERPL-MCNC: 8.8 MG/DL (ref 8.4–10.2)
CHLORIDE SERPL-SCNC: 105 MMOL/L (ref 96–108)
CO2 SERPL-SCNC: 24 MMOL/L (ref 21–32)
CREAT SERPL-MCNC: 0.76 MG/DL (ref 0.6–1.3)
GFR SERPL CREATININE-BSD FRML MDRD: 93 ML/MIN/1.73SQ M
GLUCOSE P FAST SERPL-MCNC: 87 MG/DL (ref 65–99)
POTASSIUM SERPL-SCNC: 4.1 MMOL/L (ref 3.5–5.3)
SODIUM SERPL-SCNC: 137 MMOL/L (ref 135–147)

## 2023-10-04 PROCEDURE — 80048 BASIC METABOLIC PNL TOTAL CA: CPT

## 2023-10-04 PROCEDURE — 36415 COLL VENOUS BLD VENIPUNCTURE: CPT

## 2023-10-04 PROCEDURE — 80177 DRUG SCRN QUAN LEVETIRACETAM: CPT

## 2023-10-09 ENCOUNTER — TELEPHONE (OUTPATIENT)
Dept: NEUROLOGY | Facility: CLINIC | Age: 48
End: 2023-10-09

## 2023-10-09 LAB — LEVETIRACETAM SERPL-MCNC: 14 UG/ML (ref 10–40)

## 2023-10-09 NOTE — TELEPHONE ENCOUNTER
Received Disc from [facility] containing past MRI results for patient.  Downloaded disc in patients chart and returned disc to patient via [employer]

## 2023-10-10 ENCOUNTER — HOSPITAL ENCOUNTER (OUTPATIENT)
Facility: MEDICAL CENTER | Age: 48
Discharge: HOME | End: 2023-10-10
Payer: COMMERCIAL

## 2023-10-10 DIAGNOSIS — G40.219 FOCAL EPILEPSY WITH IMPAIRMENT OF CONSCIOUSNESS, INTRACTABLE (HCC): ICD-10-CM

## 2023-10-10 DIAGNOSIS — R51.9 WORSENING HEADACHES: ICD-10-CM

## 2023-10-10 PROCEDURE — G1004 CDSM NDSC: HCPCS

## 2023-10-10 PROCEDURE — A9585 GADOBUTROL INJECTION: HCPCS | Performed by: PSYCHIATRY & NEUROLOGY

## 2023-10-10 PROCEDURE — 70553 MRI BRAIN STEM W/O & W/DYE: CPT

## 2023-10-10 RX ORDER — GADOBUTROL 604.72 MG/ML
6 INJECTION INTRAVENOUS
Status: COMPLETED | OUTPATIENT
Start: 2023-10-10 | End: 2023-10-10

## 2023-10-10 RX ADMIN — GADOBUTROL 6 ML: 604.72 INJECTION INTRAVENOUS at 11:35

## 2023-10-26 DIAGNOSIS — G43.709 CHRONIC MIGRAINE WITHOUT AURA WITHOUT STATUS MIGRAINOSUS, NOT INTRACTABLE: ICD-10-CM

## 2023-10-26 RX ORDER — NORTRIPTYLINE HYDROCHLORIDE 50 MG/1
50 CAPSULE ORAL
Qty: 90 CAPSULE | Refills: 3 | Status: SHIPPED | OUTPATIENT
Start: 2023-10-26

## 2023-10-26 NOTE — TELEPHONE ENCOUNTER
Received automated refill request for 90 days from her pharmacy; did she start nortriptyline and increase dose to 50mg at bedtime? Any side effects? If no side effects I can write for 50mg capsules instead of 25mg capsules.

## 2023-10-26 NOTE — TELEPHONE ENCOUNTER
Spoke to patient. She is taking 50mg dose of nortriptyline at bedtime. Agreeable to change to 1-50mg tablet. Also requesting 90 day supply. Script updated please sign. Fall with [facility]

## 2023-12-22 ENCOUNTER — TELEPHONE (OUTPATIENT)
Dept: NEUROLOGY | Facility: CLINIC | Age: 48
End: 2023-12-22

## 2023-12-22 NOTE — TELEPHONE ENCOUNTER
Comments    Hello   Requesting that a physician order be placed in Epic or faxed for this patient. Patient has an upcoming appointment with [facility]'s Neurology and is required to have a referral from their PCP. Please feel free to call with any questions.  Thank you   Genoveva Pena   Prior auth/ referral specialist [facility]   Phone [phone]  Fax [phone]

## 2023-12-27 ENCOUNTER — OFFICE VISIT (OUTPATIENT)
Dept: NEUROLOGY | Facility: CLINIC | Age: 48
End: 2023-12-27
Payer: COMMERCIAL

## 2023-12-27 VITALS
WEIGHT: 161 LBS | HEART RATE: 111 BPM | OXYGEN SATURATION: 97 % | DIASTOLIC BLOOD PRESSURE: 94 MMHG | SYSTOLIC BLOOD PRESSURE: 160 MMHG | RESPIRATION RATE: 18 BRPM | HEIGHT: 66 IN | TEMPERATURE: 98.2 F | BODY MASS INDEX: 25.88 KG/M2

## 2023-12-27 DIAGNOSIS — Z87.820 HISTORY OF TRAUMATIC BRAIN INJURY: ICD-10-CM

## 2023-12-27 DIAGNOSIS — G40.219 FOCAL EPILEPSY WITH IMPAIRMENT OF CONSCIOUSNESS, INTRACTABLE (HCC): Primary | ICD-10-CM

## 2023-12-27 DIAGNOSIS — G43.709 CHRONIC MIGRAINE WITHOUT AURA WITHOUT STATUS MIGRAINOSUS, NOT INTRACTABLE: ICD-10-CM

## 2023-12-27 PROCEDURE — 99214 OFFICE O/P EST MOD 30 MIN: CPT | Performed by: PHYSICIAN ASSISTANT

## 2023-12-27 RX ORDER — NORTRIPTYLINE HYDROCHLORIDE 25 MG/1
CAPSULE ORAL
Qty: 14 CAPSULE | Refills: 0 | Status: SHIPPED | OUTPATIENT
Start: 2023-12-27 | End: 2024-01-18

## 2023-12-27 RX ORDER — NORTRIPTYLINE HYDROCHLORIDE 50 MG/1
100 CAPSULE ORAL
Qty: 180 CAPSULE | Refills: 3 | Status: SHIPPED | OUTPATIENT
Start: 2023-12-27 | End: 2024-01-18

## 2023-12-27 NOTE — PATIENT INSTRUCTIONS
Plan:   Focal Epilepsy s/p right temporal lobectomy  - continue with Levetiracetam 1000mg twice a day  - call the office if any concerns for breakthrough seizure      Worsening headaches, chronic daily migraines, TBI  - increase nortriptyline.  Start with taking a 50mg capsule and a 25mg cap together to make 75mg x 2 weeks.  Then increase to 100mg capsule take 1 at bedtime   - limit the use of acetaminophen and ibuprofen to no more than 2 days a week as these medications can cause worsening migraines  - try rizatriptan (Maxalt) 10mg tab take one tab at the start of a headache, may repeat in 2 hours.  Side effects include chest pain, palpitations, and flushing     Follow-up in 3 months with advanced practice provider and 7 months
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
## 2023-12-29 ENCOUNTER — TELEPHONE (OUTPATIENT)
Dept: NEUROLOGY | Facility: CLINIC | Age: 48
End: 2023-12-29

## 2024-01-02 NOTE — TELEPHONE ENCOUNTER
Patient has already been seen by Carin. Not sure why this was sent to clinical. Please advise if anything else is needed.

## 2024-01-18 ENCOUNTER — TELEPHONE (OUTPATIENT)
Dept: NEUROLOGY | Facility: CLINIC | Age: 49
End: 2024-01-18

## 2024-01-18 DIAGNOSIS — G43.709 CHRONIC MIGRAINE WITHOUT AURA WITHOUT STATUS MIGRAINOSUS, NOT INTRACTABLE: Primary | ICD-10-CM

## 2024-01-18 RX ORDER — TOPIRAMATE 25 MG/1
CAPSULE, COATED PELLETS ORAL
Qty: 60 CAPSULE | Refills: 5 | Status: SHIPPED | OUTPATIENT
Start: 2024-01-18

## 2024-02-08 DIAGNOSIS — G43.709 CHRONIC MIGRAINE WITHOUT AURA WITHOUT STATUS MIGRAINOSUS, NOT INTRACTABLE: ICD-10-CM

## 2024-02-08 RX ORDER — RIZATRIPTAN BENZOATE 10 MG/1
TABLET ORAL
Qty: 9 TABLET | Refills: 5 | Status: SHIPPED | OUTPATIENT
Start: 2024-02-08

## 2024-03-19 ENCOUNTER — OFFICE VISIT (OUTPATIENT)
Dept: NEUROLOGY | Facility: CLINIC | Age: 49
End: 2024-03-19
Payer: COMMERCIAL

## 2024-03-19 VITALS
SYSTOLIC BLOOD PRESSURE: 124 MMHG | WEIGHT: 157 LBS | DIASTOLIC BLOOD PRESSURE: 84 MMHG | HEART RATE: 93 BPM | BODY MASS INDEX: 25.23 KG/M2 | OXYGEN SATURATION: 96 % | HEIGHT: 66 IN | TEMPERATURE: 97.9 F | RESPIRATION RATE: 18 BRPM

## 2024-03-19 DIAGNOSIS — G40.219 FOCAL EPILEPSY WITH IMPAIRMENT OF CONSCIOUSNESS, INTRACTABLE (HCC): Primary | ICD-10-CM

## 2024-03-19 DIAGNOSIS — G43.709 CHRONIC MIGRAINE WITHOUT AURA WITHOUT STATUS MIGRAINOSUS, NOT INTRACTABLE: ICD-10-CM

## 2024-03-19 DIAGNOSIS — M26.609 TMJ DYSFUNCTION: ICD-10-CM

## 2024-03-19 DIAGNOSIS — Z87.820 HISTORY OF TRAUMATIC BRAIN INJURY: ICD-10-CM

## 2024-03-19 PROCEDURE — 99214 OFFICE O/P EST MOD 30 MIN: CPT | Performed by: PHYSICIAN ASSISTANT

## 2024-03-19 RX ORDER — OMEPRAZOLE 40 MG/1
CAPSULE, DELAYED RELEASE ORAL
COMMUNITY
Start: 2024-02-01

## 2024-03-19 RX ORDER — TOPIRAMATE 25 MG/1
TABLET ORAL
Qty: 90 TABLET | Refills: 5 | Status: SHIPPED | OUTPATIENT
Start: 2024-03-19

## 2024-03-19 NOTE — PATIENT INSTRUCTIONS
Increase topiramate to 75mg at bedtime.  If you feel that helps even further but still room for improvement, let me know and I can increase it to 100mg and change to a 100mg tablet  Continue magnesium and B2  Continue to stay well hydrated, eating regularly, getting adequate sleep  Can use rizatriptan for migraine   Continue levetiracetam 1000mg twice a day   Speak with dentist about TMJ. They may have someone specific they refer to   Would stick with the same neuro-ophthalmologist for prism glasses and vision therapy  Follow up 4 months with Dr. Ortiz or sooner if needed
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
## 2024-04-03 DIAGNOSIS — G40.219 FOCAL EPILEPSY WITH IMPAIRMENT OF CONSCIOUSNESS, INTRACTABLE (HCC): ICD-10-CM

## 2024-04-03 NOTE — TELEPHONE ENCOUNTER
Pt requesting script to be sent to different pharmacy.    Carin - Rx entered to be sent to [facility]. Please review and sign if in agreement.

## 2024-04-04 RX ORDER — LEVETIRACETAM 1000 MG/1
1000 TABLET ORAL 2 TIMES DAILY
Qty: 180 TABLET | Refills: 3 | Status: SHIPPED | OUTPATIENT
Start: 2024-04-04

## 2024-05-02 ENCOUNTER — PATIENT MESSAGE (OUTPATIENT)
Dept: NEUROLOGY | Facility: CLINIC | Age: 49
End: 2024-05-02

## 2024-05-04 ENCOUNTER — PATIENT MESSAGE (OUTPATIENT)
Dept: NEUROLOGY | Facility: CLINIC | Age: 49
End: 2024-05-04

## 2024-05-06 ENCOUNTER — TELEPHONE (OUTPATIENT)
Dept: NEUROLOGY | Facility: CLINIC | Age: 49
End: 2024-05-06

## 2024-05-07 NOTE — TELEPHONE ENCOUNTER
May 2, 2024  Nilsa Bergeron   to [facility] (supporting Carin De Santiago PA-C)   JK      5/2/24  6:49 PM  Hello there. I hope things are good. I have a request, please. I got a jury summons for [facility]. I had an excusal in [place] due to my disability. Would you mind writing a letter or whatever it takes up here excusing me from duty? The crowded hallways, lighting, hours, transportation, focus... a lot of reasons. Would you mind doing this and maybe emailing it to me so i can upload it to the court system? Thank you - I hope to see/talk with you again soon.  Nilsa

## 2024-05-07 NOTE — TELEPHONE ENCOUNTER
May 3, 2024  Me   to Nilsa Bergeron CB      5/3/24  5:49 PM  Thank you for contacting [facility],     Kp Ash,      We can definitely provide a jury excuse letter for you. We will need your juror # as well as the phone number for the district court. We will fax the letter to the court and send a copy to you via [employer]alo if that is okay with you.      Have a great weekend!     Ally BELTRAN RN       Last read by Nilsa Bergeron at 12:37 PM on 5/4/2024.

## 2024-05-07 NOTE — TELEPHONE ENCOUNTER
Carin De Santiago PA-C   to Neurology [place] Clinical Team 3       5/3/24  7:55 AM  Please assist with jury duty excuse letter.  Likely she needs to provide the juror number.  Thanks

## 2024-05-07 NOTE — TELEPHONE ENCOUNTER
May 4, 2024  Nilsa NEWSOME Neurology [place] Clinical (supporting Carin De Santiago PA-C)   [place]      5/4/24 12:43 PM  Thank you so very much, you're awesome.   My juror ID # is [id number]   and the [facility] phone number is [phone]     I really appreciate you, I hope you have a good weekend/week ahead

## 2024-06-18 ENCOUNTER — PATIENT MESSAGE (OUTPATIENT)
Dept: NEUROLOGY | Facility: CLINIC | Age: 49
End: 2024-06-18

## 2024-06-20 ENCOUNTER — PATIENT MESSAGE (OUTPATIENT)
Dept: NEUROLOGY | Facility: CLINIC | Age: 49
End: 2024-06-20

## 2024-06-20 NOTE — PATIENT COMMUNICATION
Carin De Santiago PA-C   to Neurology [place] Clinical Team 3     6/20/24  9:29 AM  We have not prescribed her steroids before.  She just established care with our practice in Sept (Dr. Ortiz) and I saw her for the first time in December.    She is requesting a steroid for headache?  Typically would reserve a steroid for HA in case of intractable migraine that is not responsive to typical home abortive therapies.  Have her headaches increased in frequency and intensity?

## 2024-06-21 DIAGNOSIS — G43.709 CHRONIC MIGRAINE WITHOUT AURA WITHOUT STATUS MIGRAINOSUS, NOT INTRACTABLE: Primary | ICD-10-CM

## 2024-06-21 RX ORDER — DEXAMETHASONE 2 MG/1
TABLET ORAL
Qty: 3 TABLET | Refills: 0 | Status: SHIPPED | OUTPATIENT
Start: 2024-06-21

## 2024-06-21 NOTE — PATIENT COMMUNICATION
Carin De Santiago PA-C   to Neurology [place] Clinical Team 3     6/21/24 12:51 PM  I will send in dexamethasone 2mg take 1 po x 3 days with food for her

## 2024-07-15 DIAGNOSIS — G43.709 CHRONIC MIGRAINE WITHOUT AURA WITHOUT STATUS MIGRAINOSUS, NOT INTRACTABLE: ICD-10-CM

## 2024-07-15 RX ORDER — TOPIRAMATE 25 MG/1
TABLET ORAL
Qty: 90 TABLET | Refills: 5 | Status: SHIPPED | OUTPATIENT
Start: 2024-07-15

## 2024-07-19 DIAGNOSIS — G43.709 CHRONIC MIGRAINE WITHOUT AURA WITHOUT STATUS MIGRAINOSUS, NOT INTRACTABLE: ICD-10-CM

## 2024-07-29 RX ORDER — RIZATRIPTAN BENZOATE 10 MG/1
TABLET ORAL
Qty: 9 TABLET | Refills: 0 | Status: SHIPPED | OUTPATIENT
Start: 2024-07-29
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
## 2024-08-02 ENCOUNTER — OFFICE VISIT (OUTPATIENT)
Dept: NEUROLOGY | Facility: CLINIC | Age: 49
End: 2024-08-02
Payer: COMMERCIAL

## 2024-08-02 VITALS
SYSTOLIC BLOOD PRESSURE: 110 MMHG | OXYGEN SATURATION: 99 % | HEIGHT: 66 IN | WEIGHT: 145.6 LBS | HEART RATE: 70 BPM | RESPIRATION RATE: 14 BRPM | TEMPERATURE: 97.5 F | BODY MASS INDEX: 23.4 KG/M2 | DIASTOLIC BLOOD PRESSURE: 68 MMHG

## 2024-08-02 DIAGNOSIS — G43.709 CHRONIC MIGRAINE WITHOUT AURA WITHOUT STATUS MIGRAINOSUS, NOT INTRACTABLE: Primary | ICD-10-CM

## 2024-08-02 DIAGNOSIS — G40.219 FOCAL EPILEPSY WITH IMPAIRMENT OF CONSCIOUSNESS, INTRACTABLE (HCC): ICD-10-CM

## 2024-08-02 DIAGNOSIS — H51.11 CONVERGENCE INSUFFICIENCY: ICD-10-CM

## 2024-08-02 PROCEDURE — G2211 COMPLEX E/M VISIT ADD ON: HCPCS | Performed by: PSYCHIATRY & NEUROLOGY

## 2024-08-02 PROCEDURE — 99214 OFFICE O/P EST MOD 30 MIN: CPT | Performed by: PSYCHIATRY & NEUROLOGY

## 2024-08-02 RX ORDER — TOPIRAMATE 100 MG/1
100 TABLET, FILM COATED ORAL
Qty: 90 TABLET | Refills: 1 | Status: SHIPPED | OUTPATIENT
Start: 2024-08-02

## 2024-08-04 PROBLEM — E78.1 HYPERTRIGLYCERIDEMIA: Status: ACTIVE | Noted: 2024-06-10

## 2024-08-04 PROBLEM — Q21.0 VSD (VENTRICULAR SEPTAL DEFECT): Status: ACTIVE | Noted: 2024-06-10

## 2024-08-04 NOTE — ASSESSMENT & PLAN NOTE
- Increase topiramate to 100mg at bedtime  - check BMP, topiramate, and levetiracetam level in 4 weeks  - please make sure you take riboflavin 400mg once a day (check how much riboflavin is in your B complex vitamin, take addition riboflavin to make 400mg total dose)  - limit the use of acetaminophen and ibuprofen to no more than 2 days a week as these medications can cause worsening migraines  - use rizatriptan (Maxalt) 10mg tab take one tab at the start of a headache, may repeat in 2 hours.

## 2024-08-07 ENCOUNTER — APPOINTMENT (OUTPATIENT)
Dept: LAB | Facility: CLINIC | Age: 49
End: 2024-08-07
Payer: COMMERCIAL

## 2024-08-07 DIAGNOSIS — G43.709 CHRONIC MIGRAINE WITHOUT AURA WITHOUT STATUS MIGRAINOSUS, NOT INTRACTABLE: ICD-10-CM

## 2024-08-07 LAB
ANION GAP SERPL CALCULATED.3IONS-SCNC: 8 MMOL/L (ref 4–13)
BUN SERPL-MCNC: 16 MG/DL (ref 5–25)
CALCIUM SERPL-MCNC: 9.3 MG/DL (ref 8.4–10.2)
CHLORIDE SERPL-SCNC: 109 MMOL/L (ref 96–108)
CO2 SERPL-SCNC: 23 MMOL/L (ref 21–32)
CREAT SERPL-MCNC: 0.82 MG/DL (ref 0.6–1.3)
GFR SERPL CREATININE-BSD FRML MDRD: 84 ML/MIN/1.73SQ M
GLUCOSE SERPL-MCNC: 81 MG/DL (ref 65–140)
LEVETIRACETAM SERPL-MCNC: 35.6 UG/ML (ref 12–46)
POTASSIUM SERPL-SCNC: 4.3 MMOL/L (ref 3.5–5.3)
SODIUM SERPL-SCNC: 140 MMOL/L (ref 135–147)

## 2024-08-07 PROCEDURE — 80201 ASSAY OF TOPIRAMATE: CPT

## 2024-08-07 PROCEDURE — 83520 IMMUNOASSAY QUANT NOS NONAB: CPT

## 2024-08-07 PROCEDURE — 36415 COLL VENOUS BLD VENIPUNCTURE: CPT

## 2024-08-07 PROCEDURE — 80048 BASIC METABOLIC PNL TOTAL CA: CPT

## 2024-08-09 LAB — TOPIRAMATE SERPL-MCNC: 3.3 UG/ML (ref 2–25)

## 2024-08-13 ENCOUNTER — TELEPHONE (OUTPATIENT)
Dept: NEUROLOGY | Facility: CLINIC | Age: 49
End: 2024-08-13

## 2024-08-13 NOTE — TELEPHONE ENCOUNTER
Spoke to patient. She did make dose increase of topiramate to 100mg nightly, but dose increase was not made until 8/3 or 8/4. Labs were drawn on 8/7. Please advise if any new recommendations.

## 2024-08-13 NOTE — TELEPHONE ENCOUNTER
----- Message from Carmen Ortiz MD sent at 8/9/2024  3:19 PM EDT -----  Topiramate level is relatively low - did she increase her topiramate dose to 100mg at bedtime when she went for blood work?  Levetiracetam level is fine.  BMP is okay

## 2024-08-26 NOTE — TELEPHONE ENCOUNTER
Topiramate dose was increased for management of migraines, so need to increase current dose for now.  Just wanted to know what was the dose of medication she was taking when she had blood work completed.  Medication adjustments for migraines may take 2-4 months to determine if there is any effect on migraine frequency.

## 2024-09-24 ENCOUNTER — PATIENT MESSAGE (OUTPATIENT)
Dept: NEUROLOGY | Facility: CLINIC | Age: 49
End: 2024-09-24

## 2024-09-24 DIAGNOSIS — H51.11 CONVERGENCE INSUFFICIENCY: Primary | ICD-10-CM

## 2024-09-26 NOTE — PATIENT COMMUNICATION
Bernadine call from Esther at  [facility]. They did not receive PT/OT referrals. Since they do not have Epic, they usually do not receive faxes if sent thru [facility]. She requested PT/OT referrals be faxed manually to:    [phone] (f)      Clerical - please fax both PT and OT referrals to [facility] at fax number above. Thank you.     Please fax manually, not thru Epic.   
PT and OT scripts fax over to number provider, fax confirmation is scanned into this encounter.  
no

## 2024-09-27 NOTE — PROGRESS NOTES
Review of Systems   Constitutional: Negative for appetite change, fatigue and fever. HENT: Negative. Negative for hearing loss, tinnitus, trouble swallowing and voice change. Eyes: Negative. Negative for photophobia, pain and visual disturbance. Respiratory: Negative. Negative for shortness of breath. Cardiovascular: Negative. Negative for palpitations. Gastrointestinal: Negative. Negative for nausea and vomiting. Endocrine: Negative. Negative for cold intolerance. Genitourinary: Negative. Negative for dysuria, frequency and urgency. Musculoskeletal: Negative for back pain, gait problem, myalgias and neck pain. Skin: Negative. Negative for rash. Allergic/Immunologic: Negative. Neurological: Positive for seizures (last was in 2015 ) and headaches. Negative for dizziness, tremors, syncope, facial asymmetry, speech difficulty, weakness and light-headedness. Hematological: Negative. Does not bruise/bleed easily. Psychiatric/Behavioral: Negative. Negative for confusion, hallucinations and sleep disturbance. All other systems reviewed and are negative. [facility] Care Coordination Contact    CHW spoke with member regarding her MA renewal paperwork because the eligibility review is due by (10/01/2024).  Member said called today (09/27/24) and notified [facility] about her MA renewal paperwork had completed but couldn't find to send back and she waiting for a new MA renewal package sent member by [facility].   Once she receives it, member will call [employer] # [phone] the agency that will help her by phone to complete the documents, or she may choose to use the online application.  CHW will follow up.                 TRINIDAD Castro  [facility]  [phone]

## 2024-10-31 DIAGNOSIS — G43.709 CHRONIC MIGRAINE WITHOUT AURA WITHOUT STATUS MIGRAINOSUS, NOT INTRACTABLE: ICD-10-CM

## 2024-10-31 RX ORDER — RIZATRIPTAN BENZOATE 10 MG/1
TABLET ORAL
Qty: 9 TABLET | Refills: 2 | Status: SHIPPED | OUTPATIENT
Start: 2024-10-31

## 2024-11-01 ENCOUNTER — OFFICE VISIT (OUTPATIENT)
Dept: NEUROLOGY | Facility: CLINIC | Age: 49
End: 2024-11-01
Payer: COMMERCIAL

## 2024-11-01 VITALS
DIASTOLIC BLOOD PRESSURE: 76 MMHG | HEIGHT: 66 IN | SYSTOLIC BLOOD PRESSURE: 104 MMHG | BODY MASS INDEX: 22.11 KG/M2 | TEMPERATURE: 97.8 F | RESPIRATION RATE: 18 BRPM | WEIGHT: 137.6 LBS | OXYGEN SATURATION: 98 % | HEART RATE: 80 BPM

## 2024-11-01 DIAGNOSIS — H51.11 CONVERGENCE INSUFFICIENCY: ICD-10-CM

## 2024-11-01 DIAGNOSIS — Z87.820 HISTORY OF TRAUMATIC BRAIN INJURY: ICD-10-CM

## 2024-11-01 DIAGNOSIS — G43.709 CHRONIC MIGRAINE WITHOUT AURA WITHOUT STATUS MIGRAINOSUS, NOT INTRACTABLE: ICD-10-CM

## 2024-11-01 DIAGNOSIS — G40.219 FOCAL EPILEPSY WITH IMPAIRMENT OF CONSCIOUSNESS, INTRACTABLE (HCC): Primary | ICD-10-CM

## 2024-11-01 PROCEDURE — 99214 OFFICE O/P EST MOD 30 MIN: CPT | Performed by: PHYSICIAN ASSISTANT

## 2024-11-01 NOTE — ASSESSMENT & PLAN NOTE
She reports improvement in headache/migraine frequency with topiramate, no longer chronic daily headaches.  Topiramate was increased at last visit and she feels current dose is adequate.  Migraines are less frequent and less severe.  She is aware of avoiding daily use of acetaminophen and ibuprofen to avoid rebound headaches.     Plan:   Episodic Migraines  - Continue topiramate 100mg at bedtime  - please make sure you take riboflavin 400mg once a day (check how much riboflavin is in your B complex vitamin, take addition riboflavin to make 400mg total dose)  - limit the use of acetaminophen and ibuprofen to no more than 2 days a week as these medications can cause worsening migraines  - use rizatriptan (Maxalt) 10mg tab take one tab at the start of a headache, may repeat in 2 hours.

## 2024-11-01 NOTE — ASSESSMENT & PLAN NOTE
Ms. Nilsa Bergeron is a 49 y.o. woman with episodic migraines and focal epilepsy, s/p right temporal lobectomy.  She had multiple traumatic brain injury in the past, while the one in 2012 resulted in more significant physical impairments, convergence disorder of the eye, and persistent gait impairment.  She has remained seizure-free with current dose of levetiracetam    Plan:   Focal Epilepsy  - s/p right temporal lobectomy  - continue with Levetiracetam 1000mg twice a day
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
## 2024-11-01 NOTE — PATIENT INSTRUCTIONS
Plan:   Focal Epilepsy  - s/p right temporal lobectomy  - continue with Levetiracetam 1000mg twice a day     Episodic Migraines  - Continue topiramate 100mg at bedtime  - please make sure you take riboflavin 400mg once a day (check how much riboflavin is in your B complex vitamin, take addition riboflavin to make 400mg total dose)  - limit the use of acetaminophen and ibuprofen to no more than 2 days a week as these medications can cause worsening migraines  - use rizatriptan (Maxalt) 10mg tab take one tab at the start of a headache, may repeat in 2 hours.     Convergence Insufficiency  - continue vision therapy    Follow-up in 4 months

## 2025-02-17 DIAGNOSIS — G43.709 CHRONIC MIGRAINE WITHOUT AURA WITHOUT STATUS MIGRAINOSUS, NOT INTRACTABLE: ICD-10-CM

## 2025-02-18 RX ORDER — RIZATRIPTAN BENZOATE 10 MG/1
TABLET ORAL
Qty: 9 TABLET | Refills: 2 | Status: SHIPPED | OUTPATIENT
Start: 2025-02-18

## 2025-02-20 ENCOUNTER — TELEPHONE (OUTPATIENT)
Dept: NEUROLOGY | Facility: CLINIC | Age: 50
End: 2025-02-20

## 2025-03-03 ENCOUNTER — OFFICE VISIT (OUTPATIENT)
Dept: NEUROLOGY | Facility: CLINIC | Age: 50
End: 2025-03-03
Payer: COMMERCIAL

## 2025-03-03 VITALS
HEART RATE: 62 BPM | DIASTOLIC BLOOD PRESSURE: 64 MMHG | RESPIRATION RATE: 18 BRPM | HEIGHT: 66 IN | BODY MASS INDEX: 20.83 KG/M2 | OXYGEN SATURATION: 99 % | SYSTOLIC BLOOD PRESSURE: 122 MMHG | TEMPERATURE: 98 F | WEIGHT: 129.6 LBS

## 2025-03-03 DIAGNOSIS — G40.219 FOCAL EPILEPSY WITH IMPAIRMENT OF CONSCIOUSNESS, INTRACTABLE (HCC): ICD-10-CM

## 2025-03-03 DIAGNOSIS — G43.709 CHRONIC MIGRAINE WITHOUT AURA WITHOUT STATUS MIGRAINOSUS, NOT INTRACTABLE: ICD-10-CM

## 2025-03-03 PROCEDURE — 99214 OFFICE O/P EST MOD 30 MIN: CPT | Performed by: PHYSICIAN ASSISTANT

## 2025-03-03 RX ORDER — TOPIRAMATE 100 MG/1
100 TABLET, FILM COATED ORAL
Qty: 90 TABLET | Refills: 3 | Status: SHIPPED | OUTPATIENT
Start: 2025-03-03

## 2025-03-03 RX ORDER — LEVETIRACETAM 1000 MG/1
1000 TABLET ORAL 2 TIMES DAILY
Qty: 180 TABLET | Refills: 3 | Status: SHIPPED | OUTPATIENT
Start: 2025-03-03

## 2025-03-03 NOTE — PROGRESS NOTES
Name: Nilsa Bergeron      : 1975      MRN: [id number]  Encounter Provider: Carin De Santiago PA-C  Encounter Date: 3/3/2025   Encounter department: [facility] [place]  :  Assessment & Plan  Focal epilepsy with impairment of consciousness, intractable (HCC)  Ms. Nilsa Bergeron is a 49 y.o. woman with episodic migraines and focal epilepsy, s/p right temporal lobectomy.  She had multiple traumatic brain injury in the past, while the one in 2012 resulted in more significant physical impairments, convergence disorder of the eye, and persistent gait impairment.  She has remained seizure-free with current dose of levetiracetam.  She continues with vision therapy through [facility] and feels this has been helpful.      Plan:   Focal Epilepsy  - s/p right temporal lobectomy  - continue with Levetiracetam 1000mg twice a day  Orders:    levETIRAcetam (Keppra) 1000 MG tablet; Take 1 tablet (1,000 mg total) by mouth 2 (two) times a day    Chronic migraine without aura without status migrainosus, not intractable  She reports improvement in headache/migraine frequency with topiramate, no longer chronic daily headaches.  She feels current dose is adequate.  Migraines are less frequent and less severe.  She is aware of avoiding daily use of acetaminophen and ibuprofen to avoid rebound headaches.      Plan:   Episodic Migraines  - Continue topiramate 100mg at bedtime  - continue riboflavin 400mg daily along with feverfew   - limit the use of acetaminophen and ibuprofen to no more than 2 days a week as these medications can cause worsening migraines  - use rizatriptan (Maxalt) 10mg tab take one tab at the start of a headache, may repeat in 2 hours.  Orders:    topiramate (TOPAMAX) 100 mg tablet; Take 1 tablet (100 mg total) by mouth daily at bedtime    Follow up in 4-6 months or sooner if needed      History of Present Illness   HPI   Nilsa Bergeron is a 49 y.o. ambidextrous handed female here for follow-up  "evaluation of epilepsy and recurrent headaches.      Interval History 3/3/2025  She continues to remain seizure-free.  Her migraines and general headaches have been much better controlled with topiramate.  She is very pleased with this medication and her headache control.  She is taking B2 and feverfew supplements as well.  She has gone through some stress recently.  Her dad passed away in January. Had a fall with brain bleed and recovered from that, but during the workup it was discovered he had pancreatic cancer and  3 weeks later.  Lost mom in  as well.    Still doing vision therapy at [facility] and feels that has been helpful.     AED/side effects/compliance:  Levetiracetam [id number]    Migraine prevention  Topiramate 100mg qHS     Migraine management:  Prior Abortive medications:  Acetaminophen  Ibuprofen   Rizatriptan     Prior preventative medications:  Nortriptyline - constipation  Topiramate        Event/Seizure semiology:  FIAS - she gets hot, sweaty, \"love vu\" feeling, tingling from pelvis down, nauseated, voices sound like Corbin Brown voices, feels anxious, then impaired awareness  History of generalized tonic clonic seizures in her late teens     Woman of childbearing age with Epilepsy:  Contraception: birth control  Folic acid supplement: No     Prior Epilepsy History:  Intake History 2023  LEV [id number]. She was previously seen by Dr. Tariq Fofana at [facility], last seen on 2023. In , she was hit by a steel madyson into her head at work, developed weakness of the right side, then intractable seizures. Seizures are reported as confusion, up to 16 times a day. She had epilepsy surgery at [facility] (9/10/2015, temporal lobectomy), then she was seizure free while on Keppra 1000mg twice a day. She was previously seen by Dr. Gauri Cormier at [facility] for intractable headaches (over the front or behind the right eye, she was known to have bilateral INOs and a skew deviation, " "received prism glasses), ringing in her ears.  There is a note from 7/28/2014 at [facility] (Dr. Silvia Ricardo) - she had generalized tonic clonic seizures as a teenager (19 yo). Since then she has had multiple concussions due to boxing and TBI in 2010 (hit her head on concrete) and 2012 (hit on the head with a steel beam). She complains of post-concussive symptoms of right sided weakness, visual difficulties, ataxia, photophobia, and nausea. She had a seizure during neuropsychology evaluation on 3/20/2014 - she became unfocused, leaned back in chair, unable to speak, flushed, hot, she described a feeling of \"love vu\". She was admitted to EMU 6/9-6/13/2014 and was found to have right temporal seizures. Seizures - she gets hot, sweaty, \"love vu\", tingling from pelvis down, nauseated, voices sound like Corbin Brown voices, feels anxious, then loses 2-5 minutes (unable to remember). She is then exhausted and sleeps.   There is a note by Dr. Lyles (4/2/2015) which also mentions that seizures started when she was 18 and had tonic-clonic seizures, she was initially treated with valproate for several years, then was off the medication for almost 20 years. Seizures re-emerge in June 2014.  She had intracranial EEG monitoring on 8/6/2015 with strips and depth electrodes (hippocampus and amygdala) into the right side. Then she had a right anterior temporal lobectomy with amygdalo-hippocampectomy on 9/15/2015.  Dr. Emre iKmble ([facility]). She has dysconjugate gaze when tracking with her eyes, slight left endgaze nystagmus in the left eye. May not be called MARIE but there is some incoordination between the two eyes.     Patient's history:  She starts by saying that she had a brain injury in 2012. She recalled that her seizures involve love vu, smell like she is on the beach, then she would not remember a couple of hours, these \"black outs\", her friends would say that she could not talk. She does not " "remember the episodes/seizures, she is not aware of having had more convulsive seizures since she was a teenager.   Since her brain surgery, she is not aware of having had any seizures. Last seizure that she can recall was on 9/10/2015.  She has terrible headaches, with dizziness, worse with barometric changes, hot, humid, or the rain. She has right sided pressure, sometimes pounding, sensitive to noises, headaches can last 2-5 hours, she does not get nauseated. She has to go to sleep. Currently, she has 2-3 days of headaches of the week. Headaches have been present at this frequency for the past several years. She would take acetaminophen 1000mg and alternate with ibuprofen 400mg on the days, which only take the edge off the headache. Headaches are triggered when she is overstimulated, such as too much visual stimulation. She has tried caffeine medications, which makes her migraines worse. She was previously offered antimigraine medications but she had refused because of her history of addiction.   She has bouts of insomnia, which can happen a couple of nights a week. She has been prescribed trazodone. 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     She had recently stopped taking amantadine, which was prescribed for her TBI, she felt that it was causing her to stay up. Amantadine was prescribed because she was twitching. She stopped taking amatadine about 6 months ago. She believes she sleeps more, but no impact on the twitching behavior. She was on Buspar which she also stopped 6 months ago. She is not sure why she was on Buspar, but she thought it was for her ataxia.     Interval History 8/2/2024  She had followed up with Carin De Santiago for the last two visits. We tried nortriptyline, but it caused stomach problems, gastritis, constipation, so her preventive medication was changed to topiramate. Rizatriptan did help abort her headaches. Last visit was on 3/19/2024 - headaches have " lessened with topiramate to once every 3-4 days (previously she was having daily headaches).      She reports that here have been no seizures that she is aware of. She has not had any of the auras or love vu feeling.   She continues to have terrible migraines, especially with the humidity and constant storms that come through.   Summer time her migraines are worse, essentially she has been having near daily headaches. She reports that the rizatriptan does help with the pain. But she will have to take ibuprofen (500mg once a day) and acetaminophen (1000mg once a day) on alternating days. The increase in topiramate, she believes that the medication has helped but she has to get through the summer. She has to take diuretics (over the counter Diurex, pamabrom).  She has been taking a B complex vitamin and magnesium lactate 400mg daily. She has started to take feverfew.    Interval History 11/1/2024  She is doing better with her migraines since topiramate was increased at her last visit. the end of summer was more tolerable (weather, sana heat and humidity, increase her migraines). They are much less severe now. She is very pleased with current medications.  She does say her TMJ is really bothering her and this can increase other headaches. She has a  and has an upcoming appt with her dentist and plans to speak with them about her TMJ issues.   She reports that here have been no seizures that she is aware of. She has not had any of the auras or love vu feeling.   She is doing vision therapy at [facility] for the last 5 weeks and feels this has been very helpful.       Labs 8/7/24  Levetiracetam level = 35.6 mcg/mL  Topriamate level = 3.3 mcg/mL (had just increased to the 100mg a few days prior)     Special Features  Status epilepticus: No  Self Injury Seizures: No  Precipitating Factors: None  Post-ictal state: tired     Epilepsy Risk Factors:  Abnormal pregnancy: mother consumed alcohol during  pregnancy  Abnormal birth/: she was born 2 months prematurely  Abnormal Development: No  Febrile seizures, simple: No  Febrile seizures, complex: No  CNS infection: No  Intellectual disability: No  Cerebral palsy: No  Head injury (moderate/severe): Multiple concussions, boxing, hit on the head by a steel beam in   CNS neoplasm: No  CNS malformation: No  Neurosurgical procedure: Yes - placement of depth and strip electrodes (2015), s/p right temporal lobectomy  Stroke: No  CNS autoimmune disorder: No  Alcohol abuse: Yes - last use was 2009  Drug abuse: Yes - last use was 2009  Family history Sz/epilepsy: No     Prior AEDs:  medication Max dose Time used Reason to stop   levetiracetam         oxcarbazepine     Swollen lymph node   zonisamide         valproate   As a teenager                  Prior workup:  x  Imagin2012 - [facility]  Trauma to head  No acute hemorrhage, no skull fracture     2014 - [facility]  MRI brain   Symmetric hippocampal structures  Unremarkable MRI brain study     2015 - [facility]  fMRI brain   Left hippocampus is slightly smaller than the right, but no abnormal signal or loss of internal architecture     2015 - [facility] PET brain  Inferomedial right temporal lobe appears less active than the contralateral side     2016 - [facility]  Previous anterior right temporal lobectomy  Small rim of gliosis around the operative cavity  Left hippocampus is normal morphology     10/10/2023 - MRI brain w/wo  S/p surgical changes of right temporal lobectomy  Small scattered T2/FLAIR hyperintensities in the periventricular and subcortical white matter  Absence right hippocampus  Normal left hippocampus     EEGs:  2015   EEG - [facility]  Right basal temporal sharp wave     2016 - [facility]  Normal study     Past Psychiatric History:  Depression: denies  Anxiety: denies  Psychosis: No  She was admitted to behavioral health when she was a teenager for substance abuse     Social  History  Living situation: Lives alone  Work: Completed college, disability from her TBI because she has convergence insufficiency, ataxia, dizziness  Driving: No due to visual impairment.   reports that she has quit smoking. Her smoking use included cigarettes. She has never used smokeless tobacco. She reports that she does not currently use alcohol. She reports that she does not currently use drugs.       Review of Systems   Constitutional: Negative.  Negative for fatigue and fever.   HENT: Negative.  Negative for hearing loss, tinnitus and trouble swallowing.    Eyes:  Negative for photophobia, pain and visual disturbance.   Respiratory: Negative.  Negative for cough and shortness of breath.    Cardiovascular: Negative.  Negative for chest pain and palpitations.   Gastrointestinal:  Negative for constipation, diarrhea, nausea and vomiting.   Endocrine: Negative.    Genitourinary: Negative.  Negative for difficulty urinating and urgency.   Musculoskeletal: Negative.  Negative for back pain, gait problem and neck pain.   Skin: Negative.  Negative for rash.   Neurological: Negative.  Negative for dizziness, tremors, seizures, syncope, speech difficulty, weakness, numbness and headaches.   Hematological: Negative.    Psychiatric/Behavioral:  Negative for decreased concentration and sleep disturbance. The patient is not nervous/anxious.     I have personally reviewed the MA's review of systems and made changes as necessary.    Current Outpatient Medications on File Prior to Visit   Medication Sig Dispense Refill    albuterol (PROVENTIL HFA,VENTOLIN HFA) 90 mcg/act inhaler TAKE 2 PUFFS BY MOUTH EVERY 4 TO 6 HOURS AS NEEDED      APPLE CIDER VINEGAR PO Take by mouth      B Complex Vitamins (B COMPLEX VITAMIN PO) Take 1 capsule by mouth in the morning      BIOTIN PO Take 1,000 mcg by mouth daily      BLACK PEPPER-TURMERIC PO Take by mouth      Cholecalciferol (VITAMIN D3 PO) Take 4,000 Units by mouth daily       "Levonorgest-Eth Estrad 91-Day 0.15-0.03 &0.01 MG TABS Take by mouth      MAGNESIUM PO Take 1 tablet by mouth daily Magnesium lactate 400mg      Multiple Vitamin (MULTIVITAMIN PO) Take 1 tablet by mouth daily      Omega-3 Fatty Acids (Fish Oil Omega-3) 1000 MG CAPS Take by mouth      omeprazole (PriLOSEC) 40 MG capsule       Pamabrom 50 MG TABS Take 1 tablet by mouth in the morning Diurex (No caffeine)      Probiotic, Lactobacillus, CAPS Take by mouth      rizatriptan (MAXALT) 10 mg tablet TAKE ONE TABLET BY MOUTH DAILY AS NEEDED AS DIRECTED 9 tablet 2    selenium 200 mcg Take 200 mcg by mouth daily      Zinc Sulfate (ZINC 15 PO) Take by mouth      [DISCONTINUED] levETIRAcetam (Keppra) 1000 MG tablet Take 1 tablet (1,000 mg total) by mouth 2 (two) times a day 180 tablet 3    [DISCONTINUED] topiramate (TOPAMAX) 100 mg tablet Take 1 tablet (100 mg total) by mouth daily at bedtime 90 tablet 1     No current facility-administered medications on file prior to visit.         Objective   /64 (BP Location: Right arm, Patient Position: Sitting, Cuff Size: Standard)   Pulse 62   Temp 98 °F (36.7 °C) (Temporal)   Resp 18   Ht 5' 6\" (1.676 m)   Wt 58.8 kg (129 lb 9.6 oz)   SpO2 99%   BMI 20.92 kg/m²     Physical Exam  Constitutional:       Appearance: Normal appearance.   Eyes:      Extraocular Movements: EOM normal.      Pupils: Pupils are equal, round, and reactive to light.   Neurological:      Mental Status: She is alert.      Motor: Motor strength is normal.     Deep Tendon Reflexes: Reflexes are normal and symmetric.   Psychiatric:         Mood and Affect: Mood normal.         Speech: Speech normal.         Behavior: Behavior normal.       Neurological Exam  Mental Status  Alert. Oriented to person, place, time and situation. Recent and remote memory are intact. Speech is normal. Language is fluent with no aphasia. Attention and concentration are normal.    Cranial Nerves  CN II: Very subtle constriction of " the left superior visual field .  CN III, IV, VI: Extraocular movements intact bilaterally. Pupils equal round and reactive to light bilaterally.  CN V: Facial sensation is normal.  CN VII: Full and symmetric facial movement.  CN VIII: Hearing is normal.  CN IX, X: Palate elevates symmetrically  CN XI: Shoulder shrug strength is normal.  CN XII: Tongue midline without atrophy or fasciculations.    Motor   Occasional twitching in different parts of her body, more with direct testing, especially when testing her eye movements and vision.  .   Strength is 5/5 throughout all four extremities.    Sensory  Light touch is normal in upper and lower extremities.     Reflexes  Deep tendon reflexes are 2+ and symmetric in all four extremities.    Coordination  Right: Finger-to-nose normal.Left: Finger-to-nose normal.    Gait  Casual gait is normal including stance, stride, and arm swing.

## 2025-03-03 NOTE — PATIENT INSTRUCTIONS
Plan:   Focal Epilepsy  - continue with Levetiracetam 1000mg twice a day     Episodic Migraines  - Continue topiramate 100mg at bedtime  - continue riboflavin to make 400mg total dose and feverfew  - limit the use of acetaminophen and ibuprofen to no more than 2 days a week as these medications can cause worsening migraines  - use rizatriptan (Maxalt) 10mg tab take one tab at the start of a headache, may repeat in 2 hours.     Convergence Insufficiency  - continue vision therapy     Follow-up in 4-6 months

## 2025-03-03 NOTE — ASSESSMENT & PLAN NOTE
She reports improvement in headache/migraine frequency with topiramate, no longer chronic daily headaches.  She feels current dose is adequate.  Migraines are less frequent and less severe.  She is aware of avoiding daily use of acetaminophen and ibuprofen to avoid rebound headaches.      Plan:   Episodic Migraines  - Continue topiramate 100mg at bedtime  - continue riboflavin 400mg daily along with feverfew   - limit the use of acetaminophen and ibuprofen to no more than 2 days a week as these medications can cause worsening migraines  - use rizatriptan (Maxalt) 10mg tab take one tab at the start of a headache, may repeat in 2 hours.  Orders:    topiramate (TOPAMAX) 100 mg tablet; Take 1 tablet (100 mg total) by mouth daily at bedtime

## 2025-03-03 NOTE — ASSESSMENT & PLAN NOTE
Ms. Nilsa Bergeron is a 49 y.o. woman with episodic migraines and focal epilepsy, s/p right temporal lobectomy.  She had multiple traumatic brain injury in the past, while the one in 2012 resulted in more significant physical impairments, convergence disorder of the eye, and persistent gait impairment.  She has remained seizure-free with current dose of levetiracetam.  She continues with vision therapy through [facility] and feels this has been helpful.      Plan:   Focal Epilepsy  - s/p right temporal lobectomy  - continue with Levetiracetam 1000mg twice a day  Orders:    levETIRAcetam (Keppra) 1000 MG tablet; Take 1 tablet (1,000 mg total) by mouth 2 (two) times a day

## 2025-03-08 DIAGNOSIS — G43.709 CHRONIC MIGRAINE WITHOUT AURA WITHOUT STATUS MIGRAINOSUS, NOT INTRACTABLE: ICD-10-CM

## 2025-03-10 RX ORDER — RIZATRIPTAN BENZOATE 10 MG/1
TABLET ORAL
Qty: 9 TABLET | Refills: 2 | OUTPATIENT
Start: 2025-03-10

## 2025-06-23 ENCOUNTER — PATIENT MESSAGE (OUTPATIENT)
Dept: NEUROLOGY | Facility: CLINIC | Age: 50
End: 2025-06-23

## 2025-06-26 NOTE — PATIENT COMMUNICATION
MARCELLO Ash to confirm she wanted to see Carin or Angel, she said she was okay to see Carin for now and then after that visit she said she would reschedule/schedule with Dr Ortiz. Offered 7/23/25 at 1:30pm with Carin she accepted.

## 2025-07-23 ENCOUNTER — OFFICE VISIT (OUTPATIENT)
Dept: NEUROLOGY | Facility: CLINIC | Age: 50
End: 2025-07-23
Payer: COMMERCIAL

## 2025-07-23 VITALS
OXYGEN SATURATION: 99 % | DIASTOLIC BLOOD PRESSURE: 72 MMHG | WEIGHT: 124 LBS | TEMPERATURE: 97.7 F | HEART RATE: 64 BPM | RESPIRATION RATE: 18 BRPM | SYSTOLIC BLOOD PRESSURE: 100 MMHG | BODY MASS INDEX: 19.93 KG/M2 | HEIGHT: 66 IN

## 2025-07-23 DIAGNOSIS — G43.709 CHRONIC MIGRAINE WITHOUT AURA WITHOUT STATUS MIGRAINOSUS, NOT INTRACTABLE: ICD-10-CM

## 2025-07-23 DIAGNOSIS — G40.219 FOCAL EPILEPSY WITH IMPAIRMENT OF CONSCIOUSNESS, INTRACTABLE (HCC): Primary | ICD-10-CM

## 2025-07-23 PROCEDURE — 99214 OFFICE O/P EST MOD 30 MIN: CPT | Performed by: PHYSICIAN ASSISTANT
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
## 2025-07-23 NOTE — ASSESSMENT & PLAN NOTE
Headache/migraines have improved with topiramate, no longer having daily headaches.  She feels current dose is adequate.  Migraines less frequent and less severe, although summer has been a bit more frequent, which is typical for her.  She is aware of avoiding daily use of acetaminophen and ibuprofen to avoid rebound headaches.      Plan:   Episodic Migraines  - Continue topiramate 100mg at bedtime  - continue riboflavin 400mg daily along with feverfew   - limit the use of acetaminophen and ibuprofen to no more than 2 days a week as these medications can cause worsening migraines  - use rizatriptan (Maxalt) 10mg tab take one tab at the start of a headache, may repeat in 2 hours.

## 2025-07-23 NOTE — PATIENT INSTRUCTIONS
Plan:   Focal Epilepsy  - continue with Levetiracetam 1000mg twice a day    Episodic Migraines  - Continue topiramate 100mg at bedtime  - continue riboflavin 400mg daily along with feverfew   - limit the use of acetaminophen and ibuprofen to no more than 2 days a week as these medications can cause worsening migraines  - use rizatriptan (Maxalt) 10mg tab take one tab at the start of a headache, may repeat in 2 hours.